# Patient Record
Sex: FEMALE | Race: BLACK OR AFRICAN AMERICAN | NOT HISPANIC OR LATINO
[De-identification: names, ages, dates, MRNs, and addresses within clinical notes are randomized per-mention and may not be internally consistent; named-entity substitution may affect disease eponyms.]

---

## 2019-11-19 PROBLEM — Z00.00 ENCOUNTER FOR PREVENTIVE HEALTH EXAMINATION: Status: ACTIVE | Noted: 2019-11-19

## 2019-12-03 ENCOUNTER — TRANSCRIPTION ENCOUNTER (OUTPATIENT)
Age: 74
End: 2019-12-03

## 2019-12-03 ENCOUNTER — APPOINTMENT (OUTPATIENT)
Dept: OTOLARYNGOLOGY | Facility: CLINIC | Age: 74
End: 2019-12-03
Payer: MEDICARE

## 2019-12-03 VITALS
OXYGEN SATURATION: 99 % | HEIGHT: 65 IN | HEART RATE: 73 BPM | TEMPERATURE: 98.3 F | DIASTOLIC BLOOD PRESSURE: 71 MMHG | SYSTOLIC BLOOD PRESSURE: 119 MMHG | BODY MASS INDEX: 28.66 KG/M2 | WEIGHT: 172 LBS | RESPIRATION RATE: 14 BRPM

## 2019-12-03 DIAGNOSIS — E73.9 LACTOSE INTOLERANCE, UNSPECIFIED: ICD-10-CM

## 2019-12-03 DIAGNOSIS — Z86.59 PERSONAL HISTORY OF OTHER MENTAL AND BEHAVIORAL DISORDERS: ICD-10-CM

## 2019-12-03 DIAGNOSIS — M43.16 SPONDYLOLISTHESIS, LUMBAR REGION: ICD-10-CM

## 2019-12-03 DIAGNOSIS — Z86.69 PERSONAL HISTORY OF OTHER DISEASES OF THE NERVOUS SYSTEM AND SENSE ORGANS: ICD-10-CM

## 2019-12-03 DIAGNOSIS — Z82.49 FAMILY HISTORY OF ISCHEMIC HEART DISEASE AND OTHER DISEASES OF THE CIRCULATORY SYSTEM: ICD-10-CM

## 2019-12-03 DIAGNOSIS — M25.569 PAIN IN UNSPECIFIED KNEE: ICD-10-CM

## 2019-12-03 DIAGNOSIS — Z86.39 PERSONAL HISTORY OF OTHER ENDOCRINE, NUTRITIONAL AND METABOLIC DISEASE: ICD-10-CM

## 2019-12-03 DIAGNOSIS — R26.2 DIFFICULTY IN WALKING, NOT ELSEWHERE CLASSIFIED: ICD-10-CM

## 2019-12-03 DIAGNOSIS — L72.0 EPIDERMAL CYST: ICD-10-CM

## 2019-12-03 DIAGNOSIS — K80.20 CALCULUS OF GALLBLADDER W/OUT CHOLECYSTITIS W/OUT OBSTRUCTION: ICD-10-CM

## 2019-12-03 DIAGNOSIS — Z87.19 PERSONAL HISTORY OF OTHER DISEASES OF THE DIGESTIVE SYSTEM: ICD-10-CM

## 2019-12-03 DIAGNOSIS — G89.29 PAIN IN UNSPECIFIED KNEE: ICD-10-CM

## 2019-12-03 DIAGNOSIS — Z87.09 PERSONAL HISTORY OF OTHER DISEASES OF THE RESPIRATORY SYSTEM: ICD-10-CM

## 2019-12-03 DIAGNOSIS — I10 ESSENTIAL (PRIMARY) HYPERTENSION: ICD-10-CM

## 2019-12-03 DIAGNOSIS — Z87.39 PERSONAL HISTORY OF OTHER DISEASES OF THE MUSCULOSKELETAL SYSTEM AND CONNECTIVE TISSUE: ICD-10-CM

## 2019-12-03 DIAGNOSIS — Z82.3 FAMILY HISTORY OF STROKE: ICD-10-CM

## 2019-12-03 DIAGNOSIS — Z83.3 FAMILY HISTORY OF DIABETES MELLITUS: ICD-10-CM

## 2019-12-03 DIAGNOSIS — Z83.49 FAMILY HISTORY OF OTHER ENDOCRINE, NUTRITIONAL AND METABOLIC DISEASES: ICD-10-CM

## 2019-12-03 DIAGNOSIS — R09.89 OTHER SPECIFIED SYMPTOMS AND SIGNS INVOLVING THE CIRCULATORY AND RESPIRATORY SYSTEMS: ICD-10-CM

## 2019-12-03 DIAGNOSIS — K44.9 DIAPHRAGMATIC HERNIA W/OUT OBSTRUCTION OR GANGRENE: ICD-10-CM

## 2019-12-03 DIAGNOSIS — M53.80 OTHER SPECIFIED DORSOPATHIES, SITE UNSPECIFIED: ICD-10-CM

## 2019-12-03 PROCEDURE — 31575 DIAGNOSTIC LARYNGOSCOPY: CPT

## 2019-12-03 PROCEDURE — G0268 REMOVAL OF IMPACTED WAX MD: CPT

## 2019-12-03 PROCEDURE — 99204 OFFICE O/P NEW MOD 45 MIN: CPT | Mod: 25

## 2019-12-03 PROCEDURE — 10005 FNA BX W/US GDN 1ST LES: CPT

## 2019-12-03 PROCEDURE — 99205 OFFICE O/P NEW HI 60 MIN: CPT | Mod: 25

## 2019-12-03 RX ORDER — DILTIAZEM HYDROCHLORIDE 240 MG/1
240 CAPSULE, EXTENDED RELEASE ORAL
Refills: 0 | Status: ACTIVE | COMMUNITY

## 2019-12-03 RX ORDER — ALBUTEROL SULFATE 90 UG/1
108 (90 BASE) INHALANT RESPIRATORY (INHALATION)
Refills: 0 | Status: ACTIVE | COMMUNITY

## 2019-12-03 RX ORDER — EPINEPHRINE 0.15 MG/.3ML
0.15 INJECTION INTRAMUSCULAR
Refills: 0 | Status: ACTIVE | COMMUNITY

## 2019-12-03 RX ORDER — LOSARTAN POTASSIUM 100 MG/1
100 TABLET, FILM COATED ORAL
Refills: 0 | Status: ACTIVE | COMMUNITY

## 2019-12-03 RX ORDER — SPIRONOLACTONE 25 MG/1
25 TABLET, FILM COATED ORAL
Refills: 0 | Status: ACTIVE | COMMUNITY

## 2019-12-03 RX ORDER — DOXAZOSIN 2 MG/1
2 TABLET ORAL
Refills: 0 | Status: ACTIVE | COMMUNITY

## 2019-12-03 NOTE — HISTORY OF PRESENT ILLNESS
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 Her old records are unavailable. She had been taking vitamin D3 for a while but stopped a few years ago. She had been on HCTZ for many years on and off.  She developed recurrent hypercalcemia ~ 2 years ago. She was recently evaluated by Dr. Lowe and ionized calcium was elevated at 5.8 (ULN 5.6), serum calcium 10.5 (ULN 10.3), iPTH 42 (not suppressed).  She is euthyroid and vit D 25 OH total is normal at 36.5, and GFR normal at 92. Pamela denies recent shortness of breath, voice changes, dysphagia, anterior neck pain, neck pressure or mass. There is no family history of thyroid cancer. She denies any known radiation exposures in her youth.  She denies calcium, vitamin D supplements or past use of Lithium Carbonate.  There is no family history of nephrolithiasis or renal disease.  There is no history of fragility bone fractures.  She denies muscle weakness, generalized bone aches, joint pain, depression, memory loss, brain fog, nausea, vomiting, abdominal pain, constipation, polyuria/ polydipsia, nephrolithiasis, peptic ulcer disease, or pancreatitis.  She has rare GERD.  A thyroid ultrasound was performed on 11/14/19 demonstrating mild thyromegaly. She has numerous nodules bilaterally. With the exception of a 2.6 cm left lobe nodule (site within gland not specified) TIRADS 3, 1 year f/u recommended.  FNA biopsy for the left 2.6 cm nodule was recommended based on size. No enlarged parathyroid glands were mentioned in the report. She has not had a DEXA scan yet. No further imaging of the neck has been done.

## 2019-12-03 NOTE — CONSULT LETTER
[Dear  ___] : Dear  [unfilled], [Consult Letter:] : I had the pleasure of evaluating your patient, [unfilled]. [Please see my note below.] : Please see my note below. [Consult Closing:] : Thank you very much for allowing me to participate in the care of this patient.  If you have any questions, please do not hesitate to contact me. [Sincerely,] : Sincerely, [FreeTextEntry3] : \par Bassam Bryant M.D., FACS, ECNU\par Director Center for Thyroid & Parathyroid Surgery\par [facility] at [facility]\par Certified in Thyroid/Parathyroid/Neck Ultrasound, ECNU/ AIUM\par \par , Department of Otolaryngology\par [facility] [employer] [facility] [employer] at [facility]\par

## 2019-12-03 NOTE — REASON FOR VISIT
[FreeTextEntry2] : a NTMNG and prior history of parathyroidectomy in 2007 with recurrent hypercalcemia 2 years ago. [FreeTextEntry1] : PCP is  Katheryn Alexis MD,  Endocrinologist and referrer is Yakelin Lowe MD

## 2019-12-03 NOTE — PROCEDURE
[Topical Lidocaine] : topical lidocaine [Image(s) Captured] : image(s) captured and filed [Oxymetazoline HCl] : oxymetazoline HCl [Flexible Endoscope] : examined with the flexible endoscope [Serial Number: ___] : Serial Number: [unfilled] [Cerumen Impaction] : Cerumen Impaction [Same] : same as the Pre Op Dx. [] : Removal of Cerumen [FreeTextEntry3] : .................................................[facility]\par ........................................ULTRASOUND-GUIDED THYROID FINE NEEDLE ASPIRATION BIOPSY REPORT\par \par NAME: FLORIN NIELSON.      MR#  [id number]  ..      : 1945     ..         DATE: 12/3/19 ..TIME: 5:47 PM\par \par HISTORY/ INDICATIONS: 74- year-old female with a dominant left 2.4 CM thyroid nodule to rule out malignancy and possible primary hyperparathyroidism.\par \par EXAM: Real-time high-resolution ultrasound imaging of the thyroid gland was performed in the longitudinal and transverse planes with color power Doppler supplementation and image capture.\par \par FINDINGS: A left mid to lower lobe solid nodule measuring 2.41 x 2.21 x 2.54 cm (longitudinal x AP x transverse) was identified and targeted for USG-FNA.  The nodule had the following ultrasonographic characteristics: solid, [id number], heterogeneous, with ill-defined margins, no punctate echogenic foci, grade 3 vascularity on color Doppler, and wider-than-tall.\par \par PROCEDURE: A time out took place and documented for correct patient identifiers, site and side of procedure.  After obtaining informed consent with discussion of risks, benefits and alternatives, the patient was positioned semi-supine, the skin was prepped with alcohol and 0.5 cc of 1% Lidocaine with 1:100,000 epinephrine was injected for local anesthesia. A #25-gauge needle was then passed along the perpendicular plane of the transducer, positioned within the nodule and confirmed with ultrasonography.  Multiple aspirations were made within the nodule with two separate needle punctures, four passes each.  Aspirates were smeared directly placed into both cytolyt and RNA protect solutions for cytologic analysis, immunohistochemistry, and possible molecular genomic diagnostics.  The patient tolerated the procedure well without complications and was discharged with signed post-procedure instructions indicating the importance of following up on all results. \par \par ASSESSMENT & PLAN: Successful USG-FNA of a left mid to lower lobe solid nodule was well tolerated without complications. The patient will be contacted to review the cytologic findings as soon as available for further treatment planning.  A discussion took place with the patient who accepted the responsibility to call the office to review the cytology results if no communication occurs within two weeks. Follow-up imaging in 1 year is recommended if the cytology is reported as benign, [place] Category II.\par \par Electronically signed by MARV GAITAN M.D., FACS on 12/3/19, 6 PM.\par \par [facility]: [address] [place] [address], [address],  [place] [address]\par [phone] (voice), [phone] (fax) [de-identified] : The nasal septum is minimally deviated to the right with a synechia on the left.     There are no masses or polyps and the nasal mucosa and secretions are normal. The choanae and posterior nasopharynx are normal without masses or drainage. The Eustachian tube orifices appear patent. The pharynx, including the posterior and lateral pharyngeal walls, the vallecula and base of tongue are normal without ulcerations, lesions or masses. The hypopharynx including the pyriform sinuses open well without pooling of secretions, mucosal lesions or masses. The supraglottic larynx including the epiglottis, petiole, arytenoids, glossoepiglottic, aryepiglottic and pharyngoepiglottic folds are normal without mucosal lesions, ulcerations or masses. The glottis reveals normal false vocal folds. The true vocal folds are glistening white, tense and of equal length, without paralysis, having symmetric mobility on adduction and abduction. There are no mucosal lesions, nodules, cysts, erythroplasia or leukoplakia. The posterior cricoid area has healthy pink mucosa in the interarytenoid area and esophageal inlet. There is [no] thickening/edema of the interarytenoid mucosa suggestive of posterior laryngitis from laryngopharyngeal acid reflux disease. The trachea is clear without narrowing in the immediate subglottic region, without deviation or lesions. \par  [de-identified] : Multiple thyroid nodules and preoperative assessment for recurrent hyperparathyroidism [FreeTextEntry1] : Bilateral impacted cerumen and hearing loss with tinnitus AU [FreeTextEntry6] : Copious cerumen removed bilaterally with curette and suctioned. The tympanic membranes are normal bilaterally. Hearing evaluation is recommended.

## 2019-12-04 LAB
25(OH)D3 SERPL-MCNC: 38.5 NG/ML
ALBUMIN SERPL ELPH-MCNC: 4.6 G/DL
ALP BLD-CCNC: 111 U/L
ALT SERPL-CCNC: 19 U/L
ANION GAP SERPL CALC-SCNC: 12 MMOL/L
AST SERPL-CCNC: 19 U/L
BILIRUB SERPL-MCNC: 0.2 MG/DL
BUN SERPL-MCNC: 19 MG/DL
CA-I SERPL-SCNC: 1.38 MMOL/L
CALCIUM SERPL-MCNC: 10.7 MG/DL
CALCIUM SERPL-MCNC: 10.7 MG/DL
CHLORIDE SERPL-SCNC: 104 MMOL/L
CO2 SERPL-SCNC: 26 MMOL/L
CREAT SERPL-MCNC: 0.77 MG/DL
GLUCOSE SERPL-MCNC: 91 MG/DL
PARATHYROID HORMONE INTACT: 48 PG/ML
POTASSIUM SERPL-SCNC: 4.5 MMOL/L
PROT SERPL-MCNC: 7.3 G/DL
SODIUM SERPL-SCNC: 142 MMOL/L
TSH SERPL-ACNC: 1.61 UIU/ML

## 2019-12-07 LAB — ACE BLD-CCNC: 31 U/L

## 2019-12-14 LAB
CAU: 8 MG/DL
CREAT 24H UR-MCNC: 1.1 G/24 H
CREAT ?TM UR-MCNC: 47 MG/DL
PROT ?TM UR-MCNC: 24 HR
SPECIMEN VOL 24H UR: 192 MG/24 H
SPECIMEN VOL 24H UR: 2400 ML

## 2020-01-20 ENCOUNTER — FORM ENCOUNTER (OUTPATIENT)
Age: 75
End: 2020-01-20

## 2020-01-21 ENCOUNTER — OUTPATIENT (OUTPATIENT)
Dept: OUTPATIENT SERVICES | Facility: HOSPITAL | Age: 75
LOS: 1 days | End: 2020-01-21
Payer: MEDICARE

## 2020-01-21 PROCEDURE — A9500: CPT

## 2020-01-21 PROCEDURE — 78072 PARATHYRD PLANAR W/SPECT&CT: CPT | Mod: 26

## 2020-01-21 PROCEDURE — 78072 PARATHYRD PLANAR W/SPECT&CT: CPT

## 2020-01-21 PROCEDURE — A9512: CPT

## 2020-02-06 ENCOUNTER — APPOINTMENT (OUTPATIENT)
Dept: OTOLARYNGOLOGY | Facility: CLINIC | Age: 75
End: 2020-02-06
Payer: MEDICARE

## 2020-02-06 VITALS
OXYGEN SATURATION: 98 % | TEMPERATURE: 98.8 F | SYSTOLIC BLOOD PRESSURE: 129 MMHG | DIASTOLIC BLOOD PRESSURE: 72 MMHG | RESPIRATION RATE: 15 BRPM | HEART RATE: 85 BPM

## 2020-02-06 PROCEDURE — 99214 OFFICE O/P EST MOD 30 MIN: CPT

## 2020-02-06 NOTE — HISTORY OF PRESENT ILLNESS
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 Her old records are unavailable. She had been taking vitamin D3 for a while but stopped a few years ago. She had been on HCTZ for many years on and off.  She developed recurrent hypercalcemia ~ 2 years ago. She was recently evaluated by Dr. Lowe and ionized calcium was elevated at 5.8 (ULN 5.6), serum calcium 10.5 (ULN 10.3), iPTH 42 (not suppressed).  She is euthyroid and vit D 25 OH total is normal at 36.5, and GFR normal at 92. Pamela denies recent shortness of breath, voice changes, dysphagia, anterior neck pain, neck pressure or mass. There is no family history of thyroid cancer. She denies any known radiation exposures in her youth.  She denies calcium, vitamin D supplements or past use of Lithium Carbonate.  There is no family history of nephrolithiasis or renal disease.  There is no history of fragility bone fractures.  She denies muscle weakness, generalized bone aches, joint pain, depression, memory loss, brain fog, nausea, vomiting, abdominal pain, constipation, polyuria/ polydipsia, nephrolithiasis, peptic ulcer disease, or pancreatitis.  She has rare GERD.  A thyroid ultrasound was performed on 11/14/19 demonstrating mild thyromegaly. She has numerous nodules bilaterally. With the exception of a 2.6 cm left lobe nodule (site within gland not specified) TIRADS 3, 1 year f/u recommended.  FNA biopsy for the left 2.6 cm nodule was recommended based on size. No enlarged parathyroid glands were mentioned in the report. She has not had a DEXA scan yet. No further imaging of the neck has been done.  \par  [FreeTextEntry1] : Pamela returns to review parathyroid localizing imaging with 4-D CT scan of the neck demonstrating a LEFT inferior positioned parathyroid adenoma. However, this could not be identified on the ultrasound correlate.  This was not noted on recent SPECT Sestamibi scan. Four surgical clips were noted on the right side of the neck. Repeat labs confirm primary hyperparathyroidism with a nonsuppressed PTH, elevated serum and ionized calcium, hypercalciuria and normal vitamin D 25-OH total. She had an FNA biopsy from a left mid lower lobe thyroid nodule that was benign, [place] category II in December.

## 2020-02-06 NOTE — CONSULT LETTER
[Consult Letter:] : I had the pleasure of evaluating your patient, [unfilled]. [Dear  ___] : Dear  [unfilled], [Please see my note below.] : Please see my note below. [Consult Closing:] : Thank you very much for allowing me to participate in the care of this patient.  If you have any questions, please do not hesitate to contact me. [Sincerely,] : Sincerely, [DrBomo  ___] : Dr. EGAN [FreeTextEntry3] : \par Bassam Bryant M.D., FACS, ECNU\par Director Center for Thyroid & Parathyroid Surgery\par [facility]\par Certified in Thyroid/Parathyroid/Neck Ultrasound, ECNU/ AIUM\par \par , Department of Otolaryngology\par [facility] [employer] at [facility]\par

## 2020-02-06 NOTE — DATA REVIEWED
[de-identified] : see PHI [de-identified] : see PHI [de-identified] : see HPI [de-identified] : Sestamibi scan reviewed

## 2020-02-06 NOTE — REASON FOR VISIT
[FreeTextEntry1] : PCP is Katheryn Alexis MD,  Endocrinologist and referrer is Yakelin Lowe MD [FreeTextEntry2] : a follow up visit for a NTMNG and prior history of parathyroidectomy in 2007 with recurrent hypercalcemia 2 years ago.

## 2020-06-16 ENCOUNTER — APPOINTMENT (OUTPATIENT)
Dept: OTOLARYNGOLOGY | Facility: CLINIC | Age: 75
End: 2020-06-16
Payer: MEDICARE

## 2020-06-16 VITALS
DIASTOLIC BLOOD PRESSURE: 73 MMHG | SYSTOLIC BLOOD PRESSURE: 147 MMHG | OXYGEN SATURATION: 99 % | TEMPERATURE: 97.7 F | HEART RATE: 69 BPM

## 2020-06-16 VITALS
SYSTOLIC BLOOD PRESSURE: 126 MMHG | OXYGEN SATURATION: 98 % | TEMPERATURE: 98.3 F | DIASTOLIC BLOOD PRESSURE: 76 MMHG | HEART RATE: 55 BPM

## 2020-06-16 PROCEDURE — 31575 DIAGNOSTIC LARYNGOSCOPY: CPT

## 2020-06-16 PROCEDURE — 99215 OFFICE O/P EST HI 40 MIN: CPT | Mod: 25

## 2020-06-16 NOTE — DATA REVIEWED
[de-identified] : see PHI [de-identified] : see PHI [de-identified] : Sestamibi scan reviewed [de-identified] : see HPI

## 2020-06-16 NOTE — HISTORY OF PRESENT ILLNESS
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 Her old records are unavailable. She had been taking vitamin D3 for a while but stopped a few years ago. She had been on HCTZ for many years on and off.  She developed recurrent hypercalcemia ~ 2 years ago. She was recently evaluated by Dr. Lowe and ionized calcium was elevated at 5.8 (ULN 5.6), serum calcium 10.5 (ULN 10.3), iPTH 42 (not suppressed).  She is euthyroid and vit D 25 OH total is normal at 36.5, and GFR normal at 92. Pamela denies recent shortness of breath, voice changes, dysphagia, anterior neck pain, neck pressure or mass. There is no family history of thyroid cancer. She denies any known radiation exposures in her youth.  She denies calcium, vitamin D supplements or past use of Lithium Carbonate.  There is no family history of nephrolithiasis or renal disease.  There is no history of fragility bone fractures.  She denies muscle weakness, generalized bone aches, joint pain, depression, memory loss, brain fog, nausea, vomiting, abdominal pain, constipation, polyuria/ polydipsia, nephrolithiasis, peptic ulcer disease, or pancreatitis.  She has rare GERD.  A thyroid ultrasound was performed on 11/14/19 demonstrating mild thyromegaly. She has numerous nodules bilaterally. With the exception of a 2.6 cm left lobe nodule (site within gland not specified) TIRADS 3, 1 year f/u recommended.  FNA biopsy for the left 2.6 cm nodule was recommended based on size. No enlarged parathyroid glands were mentioned in the report. She has not had a DEXA scan yet. No further imaging of the neck has been done.  \par  [FreeTextEntry1] : Pamela returns to review parathyroid localizing imaging with 4-D CT scan of the neck initially demonstrating a  left inferior positioned parathyroid adenoma. However, this could not be identified on the ultrasound correlate.  This was not noted on recent SPECT Sestamibi scan. Four surgical clips were noted on the right side of the neck. Repeat labs confirm primary hyperparathyroidism with a nonsuppressed PTH, elevated serum and ionized calcium, hypercalciuria and normal vitamin D 25-OH total. She had an FNA biopsy from a left mid lower lobe thyroid nodule that was benign, [place] category II in December.  Her 4-D CT scan was reviewed by Bassam Posada and he is now convinced that there is a 1.2 cm left superior hyperplastic parathyroid gland adjacent to a surgical clip posterior to the mid-upper left thyroid lobe and likely is the source for her recurrent PHPT.  Her DEXA scan is normal but she has hypercalciuria at 192 mg/24 hrs.  She denies fever, body aches, cough, cyanosis, chest burning, anosmia or recent known COVID exposures.  She had tested negative for COVID with PCR a few weeks ago and has no antibodies. All family members at home are well.

## 2020-06-16 NOTE — PROCEDURE
[Unable to Cooperate with Mirror] : patient unable to cooperate with mirror [Image(s) Captured] : image(s) captured and filed [Gag Reflex] : gag reflex preventing mirror examination [Topical Lidocaine] : topical lidocaine [Oxymetazoline HCl] : oxymetazoline HCl [Flexible Endoscope] : examined with the flexible endoscope [Serial Number: ___] : Serial Number: [unfilled] [de-identified] : Multiple thyroid nodules and preoperative assessment for recurrent hyperparathyroidism [de-identified] : The nasal septum is deviated to the right with a large tortuous septal spur. There are no masses or polyps and the nasal mucosa and secretions are normal. The choanae and posterior nasopharynx are normal without masses or drainage. The Eustachian tube orifices appear patent. The pharynx, including the posterior and lateral pharyngeal walls, the vallecula and base of tongue are normal without ulcerations, lesions or masses. The hypopharynx including the pyriform sinuses open well without pooling of secretions, mucosal lesions or masses. The supraglottic larynx including the epiglottis, petiole, arytenoids, glossoepiglottic, aryepiglottic and pharyngoepiglottic folds are normal without mucosal lesions, ulcerations or masses. The glottis reveals normal false vocal folds. The true vocal folds are glistening white, tense and of equal length, without paralysis, having symmetric mobility on adduction and abduction. There are no mucosal lesions, nodules, cysts, erythroplasia or leukoplakia. The posterior cricoid area has healthy pink mucosa in the interarytenoid area and esophageal inlet. There is [no] thickening/edema of the interarytenoid mucosa suggestive of posterior laryngitis from laryngopharyngeal acid reflux disease. The trachea is clear without narrowing in the immediate subglottic region, without deviation or lesions. \par

## 2020-06-16 NOTE — REASON FOR VISIT
[FreeTextEntry2] : a follow up visit for a NTMNG and prior history of parathyroidectomy in 2007 with recurrent hypercalcemia 2 years ago. [FreeTextEntry1] : PCP is Katheryn Alexis MD,  Endocrinologist and referrer is Yakelin Lowe MD

## 2020-06-16 NOTE — CONSULT LETTER
[Dear  ___] : Dear  [unfilled], [Please see my note below.] : Please see my note below. [Consult Closing:] : Thank you very much for allowing me to participate in the care of this patient.  If you have any questions, please do not hesitate to contact me. [Consult Letter:] : I had the pleasure of evaluating your patient, [unfilled]. [Sincerely,] : Sincerely, [DrBoom  ___] : Dr. EGAN [FreeTextEntry3] : \par Bassam Bryant M.D., FACS, ECNU\par Director Center for Thyroid & Parathyroid Surgery\par [facility]\par Certified in Thyroid/Parathyroid/Neck Ultrasound, ECNU/ AIUM\par \par , Department of Otolaryngology\par [facility] [employer] [facility] at [facility]\par

## 2020-06-22 LAB
25(OH)D3 SERPL-MCNC: 46.5 NG/ML
ALBUMIN SERPL ELPH-MCNC: 4.9 G/DL
ALP BLD-CCNC: 123 U/L
ALT SERPL-CCNC: 25 U/L
ANION GAP SERPL CALC-SCNC: 12 MMOL/L
AST SERPL-CCNC: 20 U/L
BILIRUB SERPL-MCNC: 0.3 MG/DL
BUN SERPL-MCNC: 15 MG/DL
CALCIUM SERPL-MCNC: 11 MG/DL
CALCIUM SERPL-MCNC: 11 MG/DL
CHLORIDE SERPL-SCNC: 101 MMOL/L
CO2 SERPL-SCNC: 27 MMOL/L
CREAT SERPL-MCNC: 0.8 MG/DL
GLUCOSE SERPL-MCNC: 106 MG/DL
PARATHYROID HORMONE INTACT: 35 PG/ML
POTASSIUM SERPL-SCNC: 4.6 MMOL/L
PROT SERPL-MCNC: 7.1 G/DL
SODIUM SERPL-SCNC: 140 MMOL/L
T4 FREE SERPL-MCNC: 1.2 NG/DL
THYROGLOB AB SERPL-ACNC: <20 IU/ML
THYROPEROXIDASE AB SERPL IA-ACNC: <10 IU/ML
TSH SERPL-ACNC: 1.5 UIU/ML

## 2020-06-25 LAB — PTH RELATED PROT SERPL-MCNC: <2 PMOL/L

## 2020-07-13 ENCOUNTER — APPOINTMENT (OUTPATIENT)
Dept: ENDOCRINOLOGY | Facility: CLINIC | Age: 75
End: 2020-07-13
Payer: MEDICARE

## 2020-07-13 VITALS
BODY MASS INDEX: 29.32 KG/M2 | DIASTOLIC BLOOD PRESSURE: 69 MMHG | HEIGHT: 65 IN | HEART RATE: 69 BPM | SYSTOLIC BLOOD PRESSURE: 123 MMHG | WEIGHT: 176 LBS

## 2020-07-13 LAB
GLUCOSE BLDC GLUCOMTR-MCNC: 87
HBA1C MFR BLD HPLC: 6.2

## 2020-07-13 PROCEDURE — 99205 OFFICE O/P NEW HI 60 MIN: CPT | Mod: 25

## 2020-07-13 PROCEDURE — 82962 GLUCOSE BLOOD TEST: CPT

## 2020-07-13 PROCEDURE — 83036 HEMOGLOBIN GLYCOSYLATED A1C: CPT | Mod: QW

## 2020-07-21 NOTE — HISTORY OF PRESENT ILLNESS
[FreeTextEntry1] : Ms. Stern is a 75 year-old woman with a history of type 2 diabetes mellitus, recurrent primary hyperparathyroidism, thyroid nodules, hypertension presenting to establish care with me for her endocrine issues. She is referred by Dr. Bassam Bryant, who is managing her thyroid nodules. She is a retired nurse.\par \par Primary hyperparathyroidism.\par She was diagnosed with primary hyperparathyroidism in the 1990s and is status post parathyroid surgery in 2007.\par She developed recurrent primary hyperparathyroidism around 2017 with plan for a second parathyroid procedure. Imaging demonstrated a 1.2 cm left superior hyperplastic parathyroid gland adjacent to a surgical clip posterior to the mid-upper left thyroid lobe.\par No history of kidney stones. No nephrolithiasis on renal ultrasound in November 2019. 24 hour urine calcium excretion 192 mg in December 2019 (normal range typically  mg for women).\par No history of fracture. Bone density in December 2019 within range at the spine, hip, and wrist. \par She has yogurt most days. She has a [employer] multivitamin (calcium 450 mg, vitamin D 400 intl units) daily.  \par No history of radiation therapy. She was on and off hydrochlorothiazide in the past. No history of lithium use.\par Father with a history of primary hyperparathyroidism. No other endocrine tumors. \par \par Type 2 diabetes mellitus. Point-of-care HbA1c 6.2% and blood glucose 87 mg/dL today. No known history of micro- or macrovascular complications.\par She was diagnosed with diabetes in 2015. No hospitalizations for hypo- or hyperglycemia.\par She is currently taking metformin 500 mg daily\par She is on a blood pressure regimen\par She is not on a statin for cholesterol\par Nephrology screening: Treated with an angiotenin receptor blocker\par Last ophthalmology appointment: Over six months; appointment was cancelled due to the pandemic\par Last podiatry appointment: June 2020\par Last dental appointment: Over six months; upcoming appointment\par \par She has occasional palpitations. No chest pain, shortness of breath, polyuria/polydipsia.

## 2020-07-21 NOTE — ASSESSMENT
[FreeTextEntry1] : Primary hyperparathyroidism. She was diagnosed with primary hyperparathyroidism in the 1990s and is status post parathyroid surgery in 2007. She developed recurrent primary hyperparathyroidism around 2017. She has hypercalcemia with inappropriately normal PTH concentrations consistent with primary hyperparathyroidism. Evaluation for other causes of hypercalcemia unremarkable. We discussed the complications of primary hyperparathyroidism, including but not limited to hypercalcemia, nephrolithiasis, and osteoporosis. We discussed the recommendations for calcium and vitamin D intake; there is no indication to restrict calcium intake in patients with primary hyperparathyroidism. She may be interested in genetic testing due to her family history of primary hyperparathyroidism in her father and personal history of recurrent disease. She is reconsidering whether she wants to proceed with parathyroid surgery at this time versus a later date; I advised she discuss with Dr. Bryant immediately. \par Calcium 9974-7450 mg daily from diet and supplements (to be taken in divided doses as no more than 500-600 mg can be absorbed at one time); advised dietary calcium\par Continue current vitamin D regimen\par \par Type 2 diabetes mellitus. Point-of-care HbA1c 6.2% and blood glucose 87 mg/dL today. No known history of micro- or macrovascular complications. I congratulated her on her excellent glycemic control. We discussed the importance of diet and exercise and lifestyle modification for glycemic control.\par Continue metformin 500 mg daily\par She is on a blood pressure regimen; blood pressure around goal\par She is not on a statin for cholesterol; she declines therapy at this time\par Nephrology screening: Treated with an angiotenin receptor blocker\par Last ophthalmology appointment: Over six months; appointment was cancelled due to the pandemic and advised appointment when appropriate\par Last podiatry appointment: June 2020\par Last dental appointment: Over six months; upcoming appointment\par \par Return to see me in 6 months. Patient advised to call earlier with significant hypo- or hyperglycemia. \par \par CC:\par Dr. Katheryn Alexis, Fax [phone]

## 2020-07-21 NOTE — PHYSICAL EXAM
[Alert] : alert [Healthy Appearance] : healthy appearance [No Acute Distress] : no acute distress [Normal Sclera/Conjunctiva] : normal sclera/conjunctiva [No Respiratory Distress] : no respiratory distress [Clear to Auscultation] : lungs were clear to auscultation bilaterally [Normal Rate] : heart rate was normal [Normal S1, S2] : normal S1 and S2 [Regular Rhythm] : with a regular rhythm [No Stigmata of Cushings Syndrome] : no stigmata of Cushings Syndrome [Normal Gait] : normal gait [Normal Insight/Judgement] : insight and judgment were intact [Kyphosis] : no kyphosis present [de-identified] : no moon facies, no supraclavicular fat pads

## 2020-07-22 ENCOUNTER — APPOINTMENT (OUTPATIENT)
Dept: OTOLARYNGOLOGY | Facility: HOSPITAL | Age: 75
End: 2020-07-22

## 2020-12-31 NOTE — DATA REVIEWED
[FreeTextEntry1] : Most recent bone mineral density\par Date: December 10, 2019\par Source: Lunar\par Site: [facility]\par \par Site	BMD (g/cm2)	T-score	Change previous	Change baseline	\par Lumbar spine	1.080	-0.7\par Femoral neck	0.944	-0.7 (left)	\par Total hip	                0.946       -0.5 (left)         \par Distal radius           	0.684       -0.3         \par DXA Comments: \par  
Name band;

## 2021-03-03 ENCOUNTER — APPOINTMENT (OUTPATIENT)
Dept: ENDOCRINOLOGY | Facility: CLINIC | Age: 76
End: 2021-03-03
Payer: MEDICARE

## 2021-03-03 ENCOUNTER — APPOINTMENT (OUTPATIENT)
Dept: OTOLARYNGOLOGY | Facility: CLINIC | Age: 76
End: 2021-03-03
Payer: MEDICARE

## 2021-03-03 VITALS
BODY MASS INDEX: 30.45 KG/M2 | HEART RATE: 86 BPM | WEIGHT: 183 LBS | DIASTOLIC BLOOD PRESSURE: 82 MMHG | SYSTOLIC BLOOD PRESSURE: 169 MMHG

## 2021-03-03 VITALS — TEMPERATURE: 97.8 F

## 2021-03-03 DIAGNOSIS — H61.23 IMPACTED CERUMEN, BILATERAL: ICD-10-CM

## 2021-03-03 DIAGNOSIS — I10 ESSENTIAL (PRIMARY) HYPERTENSION: ICD-10-CM

## 2021-03-03 LAB
GLUCOSE BLDC GLUCOMTR-MCNC: 89
HBA1C MFR BLD HPLC: 6.1

## 2021-03-03 PROCEDURE — 82962 GLUCOSE BLOOD TEST: CPT

## 2021-03-03 PROCEDURE — 83036 HEMOGLOBIN GLYCOSYLATED A1C: CPT | Mod: QW

## 2021-03-03 PROCEDURE — 99072 ADDL SUPL MATRL&STAF TM PHE: CPT

## 2021-03-03 PROCEDURE — 99214 OFFICE O/P EST MOD 30 MIN: CPT | Mod: 25

## 2021-03-03 PROCEDURE — 31575 DIAGNOSTIC LARYNGOSCOPY: CPT

## 2021-03-03 PROCEDURE — 69210 REMOVE IMPACTED EAR WAX UNI: CPT

## 2021-03-03 RX ORDER — MULTIVITAMIN
TABLET ORAL
Refills: 0 | Status: ACTIVE | COMMUNITY

## 2021-03-03 NOTE — PROCEDURE
[Image(s) Captured] : image(s) captured and filed [Unable to Cooperate with Mirror] : patient unable to cooperate with mirror [Gag Reflex] : gag reflex preventing mirror examination [Topical Lidocaine] : topical lidocaine [Oxymetazoline HCl] : oxymetazoline HCl [Flexible Endoscope] : examined with the flexible endoscope [Serial Number: ___] : Serial Number: [unfilled] [Cerumen Impaction] : Cerumen Impaction [Same] : same as the Pre Op Dx. [] : Removal of Cerumen [de-identified] : The nasal septum is deviated to the right with a large serpentine septal spur. There are no masses or polyps and the nasal mucosa and secretions are normal. The choanae and posterior nasopharynx are normal without masses or drainage. The Eustachian tube orifices appear patent. The pharynx, including the posterior and lateral pharyngeal walls, the vallecula and base of tongue are normal without ulcerations, lesions or masses. The hypopharynx including the pyriform sinuses open well without pooling of secretions, mucosal lesions or masses. The supraglottic larynx including the epiglottis, petiole, arytenoids, glossoepiglottic, aryepiglottic and pharyngoepiglottic folds are normal without mucosal lesions, ulcerations or masses. The glottis reveals normal false vocal folds. The true vocal folds are glistening white, tense and of equal length, without paralysis, having symmetric mobility on adduction and abduction. There are no mucosal lesions, nodules, cysts, erythroplasia or leukoplakia. The posterior cricoid area has healthy pink mucosa in the interarytenoid area and esophageal inlet. There is mild thickening/edema of the interarytenoid mucosa suggestive of posterior laryngitis from laryngopharyngeal acid reflux disease. The trachea is clear without narrowing in the immediate subglottic region, without deviation or lesions. \par  [de-identified] : Multiple thyroid nodules and recurrent hyperparathyroidism [FreeTextEntry1] : Bilateral cerumen impaction [FreeTextEntry6] : Copious cerumen removed from both external ear canals with curettes. The tympanic membranes were normal.

## 2021-03-03 NOTE — CONSULT LETTER
[Dear  ___] : Dear  [unfilled], [Consult Letter:] : I had the pleasure of evaluating your patient, [unfilled]. [Please see my note below.] : Please see my note below. [Consult Closing:] : Thank you very much for allowing me to participate in the care of this patient.  If you have any questions, please do not hesitate to contact me. [Sincerely,] : Sincerely, [DrBoom  ___] : Dr. EGAN [FreeTextEntry3] : \par Bassam Bryant M.D., FACS, ECNU\par Director [facility] Parathyroid [facility]\par [facility]\par Certified in Thyroid/Parathyroid/Neck Ultrasound, ECNU/ AIUM\par \par , Department of Otolaryngology\par [facility] [employer] [facility] at [facility]\par

## 2021-03-03 NOTE — REASON FOR VISIT
[No Acute Distress] : no acute distress [Well Nourished] : well nourished [Well Developed] : well developed [Well-Appearing] : well-appearing [Normal Sclera/Conjunctiva] : normal sclera/conjunctiva [Normal Outer Ear/Nose] : the outer ears and nose were normal in appearance [EOMI] : extraocular movements intact [Normal Oropharynx] : the oropharynx was normal [Normal TMs] : both tympanic membranes were normal [No Lymphadenopathy] : no lymphadenopathy [Supple] : supple [No JVD] : no jugular venous distention [Thyroid Normal, No Nodules] : the thyroid was normal and there were no nodules present [No Respiratory Distress] : no respiratory distress  [No Accessory Muscle Use] : no accessory muscle use [Normal Rate] : normal rate  [Clear to Auscultation] : lungs were clear to auscultation bilaterally [Regular Rhythm] : with a regular rhythm [No Murmur] : no murmur heard [Normal S1, S2] : normal S1 and S2 [No Carotid Bruits] : no carotid bruits [Pedal Pulses Present] : the pedal pulses are present [No Edema] : there was no peripheral edema [Soft] : abdomen soft [Non Tender] : non-tender [Non-distended] : non-distended [No Masses] : no abdominal mass palpated [Normal Bowel Sounds] : normal bowel sounds [FreeTextEntry2] : a follow up visit for a NTMNG and prior history of parathyroidectomy in 2007 with recurrent hypercalcemia 2 years ago. [No CVA Tenderness] : no CVA  tenderness [FreeTextEntry1] : PCP is Katheryn Alexis MD,  Endocrinologist and referrer is Yakelin Lowe MD [Normal Anterior Cervical Nodes] : no anterior cervical lymphadenopathy [Normal Posterior Cervical Nodes] : no posterior cervical lymphadenopathy [No Joint Swelling] : no joint swelling [Grossly Normal Strength/Tone] : grossly normal strength/tone [No Rash] : no rash [No Focal Deficits] : no focal deficits [Coordination Grossly Intact] : coordination grossly intact [Normal Gait] : normal gait [Normal Insight/Judgement] : insight and judgment were intact [Normal Affect] : the affect was normal [de-identified] : + l/s spinal tenderness  [de-identified] : + b/l thigh tenderness

## 2021-03-03 NOTE — DATA REVIEWED
[de-identified] : see PHI [de-identified] : see PHI [de-identified] : see HPI [de-identified] : Sestamibi scan reviewed

## 2021-03-03 NOTE — HISTORY OF PRESENT ILLNESS
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 Her old records are unavailable. She had been taking vitamin D3 for a while but stopped a few years ago. She had been on HCTZ for many years on and off.  She developed recurrent hypercalcemia ~ 2 years ago. She was recently evaluated by Dr. Lowe and ionized calcium was elevated at 5.8 (ULN 5.6), serum calcium 10.5 (ULN 10.3), iPTH 42 (not suppressed).  She is euthyroid and vit D 25 OH total is normal at 36.5, and GFR normal at 92. Pamela denies recent shortness of breath, voice changes, dysphagia, anterior neck pain, neck pressure or mass. There is no family history of thyroid cancer. She denies any known radiation exposures in her youth.  She denies calcium, vitamin D supplements or past use of Lithium Carbonate.  There is no family history of nephrolithiasis or renal disease.  There is no history of fragility bone fractures.  She denies muscle weakness, generalized bone aches, joint pain, depression, memory loss, brain fog, nausea, vomiting, abdominal pain, constipation, polyuria/ polydipsia, nephrolithiasis, peptic ulcer disease, or pancreatitis.  She has rare GERD.  A thyroid ultrasound was performed on 11/14/19 demonstrating mild thyromegaly. She has numerous nodules bilaterally. With the exception of a 2.6 cm left lobe nodule (site within gland not specified) TIRADS 3, 1 year f/u recommended.  FNA biopsy for the left 2.6 cm nodule was recommended based on size. No enlarged parathyroid glands were mentioned in the report. She has not had a DEXA scan yet. No further imaging of the neck has been done.  \par  [FreeTextEntry1] : Pamela returns to review parathyroid localizing imaging with 4-D CT scan of the neck initially demonstrating a  left inferior positioned parathyroid adenoma. However, this could not be identified on the ultrasound correlate.  This was not noted on recent SPECT Sestamibi scan. Four surgical clips were noted on the right side of the neck. Repeat labs confirm primary hyperparathyroidism with a nonsuppressed PTH, elevated serum and ionized calcium, hypercalciuria and normal vitamin D 25-OH total. She had an FNA biopsy from a left mid lower lobe thyroid nodule that was benign, [place] category II in December.  Her 4-D CT scan was reviewed by Bassam Posada and he is convinced that there is a 1.2 cm left superior hyperplastic parathyroid gland adjacent to a surgical clip posterior to the mid-upper left thyroid lobe and likely is the source for her recurrent PHPT.  Her DEXA scan is normal but she has hypercalciuria at 192 mg/24 hrs.  She denies fever, body aches, cough, cyanosis, chest burning, anosmia or recent known COVID exposures.  She had tested negative for COVID with PCR but has no antibodies. All family members at home are well. She has now been vaccinated. She is being monitored by Dr. Hansen now.  A repeat vitamin D 25-OH is pending. She had a followup thyroid ultrasound in January of this year and all imaged nodules were stable. It was suggested that a left mid lobe nodule undergo FNA biopsy but this had been biopsied already and benign.  She is still reluctant to have parathyroidectomy due to health issues for her SO.  She is drinking more fluids lately.  She had no stones on renal US in 2019. Her last DEAX scan was normal in Dec 2019.

## 2021-03-04 LAB
25(OH)D3 SERPL-MCNC: 40.1 NG/ML
CHOLEST SERPL-MCNC: 178 MG/DL
CREAT SPEC-SCNC: 36 MG/DL
HDLC SERPL-MCNC: 56 MG/DL
LDLC SERPL CALC-MCNC: 98 MG/DL
MICROALBUMIN 24H UR DL<=1MG/L-MCNC: <1.2 MG/DL
MICROALBUMIN/CREAT 24H UR-RTO: NORMAL MG/G
NONHDLC SERPL-MCNC: 122 MG/DL
T4 FREE SERPL-MCNC: 1.2 NG/DL
TRIGL SERPL-MCNC: 119 MG/DL
TSH SERPL-ACNC: 0.88 UIU/ML
VIT B12 SERPL-MCNC: 1143 PG/ML

## 2021-03-12 ENCOUNTER — NON-APPOINTMENT (OUTPATIENT)
Age: 76
End: 2021-03-12

## 2021-03-15 LAB
METANEPHRINE, PL: 10.9 PG/ML
NORMETANEPHRINE, PL: 95.3 PG/ML

## 2021-03-17 RX ORDER — ATORVASTATIN CALCIUM 10 MG/1
10 TABLET, FILM COATED ORAL
Refills: 0 | Status: DISCONTINUED | COMMUNITY
End: 2021-03-17

## 2021-03-17 NOTE — ADDENDUM
[FreeTextEntry1] : Recent laboratory results as below; discussed with Ms. Stern. LDL 98 mg/dL; statin therapy still recommended if LDL >70 mg/dL. She has seen her primary care provider and will be started on atorvastatin 10 mg daily. Urine microalbumin within range. TSH/free T4, vitamin D, vitamin B12 within range. Plasma metanephrines within range. 3/15/21\par \par There is a possible drug interaction between atorvastatin and spironolactone. Ms. Stern's primary care provider is not available at this time to change the prescription. I changed the statin prescription from atorvastatin to rosuvastatin 5 mg daily. 3/17/21

## 2021-03-17 NOTE — DATA REVIEWED
[FreeTextEntry1] : Laboratories (February 4, 2021) reviewed and significant for: \par Calcium 10.9 mg/dL (normal: 8.7-10.3)\par Ionized calcium 6.2 mg/dL (normal: 4.5-5.6)\par PTH 42 pg/mL\par BUN/creatinine 17/0.73 mg/dL (eGFR 94 mL/min)\par HbA1c 6.5%\par \par Most recent bone mineral density\par Date: December 10, 2019\par Source: First Opinion\par Site: [facility]\par \par Site	BMD (g/cm2)	T-score	Change previous	Change baseline	\par Lumbar spine	1.080	-0.7\par Femoral neck	0.944	-0.7 (left)	\par Total hip	                0.946       -0.5 (left)         \par Distal radius           	0.684       -0.3         \par DXA Comments:

## 2021-03-17 NOTE — PHYSICAL EXAM
[Alert] : alert [Healthy Appearance] : healthy appearance [No Acute Distress] : no acute distress [Normal Sclera/Conjunctiva] : normal sclera/conjunctiva [No Respiratory Distress] : no respiratory distress [Clear to Auscultation] : lungs were clear to auscultation bilaterally [Normal S1, S2] : normal S1 and S2 [Normal Rate] : heart rate was normal [Regular Rhythm] : with a regular rhythm [No Stigmata of Cushings Syndrome] : no stigmata of Cushings Syndrome [Normal Gait] : normal gait [Normal Insight/Judgement] : insight and judgment were intact [Kyphosis] : no kyphosis present [de-identified] : no moon facies, no supraclavicular fat pads [de-identified] : Podiatry examination performed in February 2021

## 2021-03-17 NOTE — HISTORY OF PRESENT ILLNESS
[FreeTextEntry1] : Ms. Stern is a 75 year-old woman with a history of type 2 diabetes mellitus, recurrent primary hyperparathyroidism, thyroid nodules, hypertension presenting for follow-up of her endocrine issues. I saw her for an initial visit in July 2020. She was referred by Dr. Bassam Bryant, who is managing her thyroid nodules. She is a retired nurse.\par \par Primary hyperparathyroidism.\par She was diagnosed with primary hyperparathyroidism in the 1990s and is status post parathyroid surgery in 2007.\par She developed recurrent primary hyperparathyroidism around 2017 with plan for a second parathyroid procedure. Imaging demonstrated a 1.2 cm left superior hyperplastic parathyroid gland adjacent to a surgical clip posterior to the mid-upper left thyroid lobe.\par No history of kidney stones. No nephrolithiasis on renal ultrasound in November 2019. 24 hour urine calcium excretion 192 mg in December 2019 (normal range typically  mg for women).\par No history of fracture. Bone density in December 2019 within range at the spine, hip, and wrist. \par She has yogurt daily. She has a [employer] multivitamin (calcium 450 mg, vitamin D 400 intl units) daily.  \par No history of radiation therapy. She was on and off hydrochlorothiazide in the past. No history of lithium use.\par Father with a history of primary hyperparathyroidism. No other endocrine tumors. \par \par Type 2 diabetes mellitus. Point-of-care HbA1c 6.1% and blood glucose 89 mg/dL today; HbA1c 6.2% in July 2020. No known history of micro- or macrovascular complications.\par She was diagnosed with diabetes in 2015. No hospitalizations for hypo- or hyperglycemia.\par At her initial visit she was taking metformin 500 mg daily, increased to 500 mg twice daily in February 2021\par She is on a blood pressure regimen\par She is not on a statin for cholesterol\par Nephrology screening: Treated with an angiotenin receptor blocker\par Last ophthalmology appointment: February 2021\par Last podiatry appointment: February 2021\par Last dental appointment: Regular appointments\par \par Interim History \par Her blood pressure has been up and down. Her cardiologist increased one of her blood pressure medications last visit; she will be scheduling a follow-up appointment. \par Weight is up 7 pounds since last visit. She has been less physically active. No chest pain, shortness of breath, polyuria/polydipsia.\par Medical and surgical history, medications, allergies, social and family history reviewed and updated as needed.

## 2021-03-17 NOTE — ASSESSMENT
[FreeTextEntry1] : Primary hyperparathyroidism. She was diagnosed with primary hyperparathyroidism in the 1990s and is status post parathyroid surgery in 2007. She developed recurrent primary hyperparathyroidism around 2017. She has hypercalcemia with inappropriately normal PTH concentrations consistent with primary hyperparathyroidism. Evaluation for other causes of hypercalcemia unremarkable. We discussed the complications of primary hyperparathyroidism, including but not limited to hypercalcemia, nephrolithiasis, and osteoporosis. We discussed the recommendations for calcium and vitamin D intake; there is no indication to restrict calcium intake in patients with primary hyperparathyroidism. She may be interested in genetic testing due to her family history of primary hyperparathyroidism in her father and personal history of recurrent disease. She is reconsidering whether she wants to proceed with parathyroid surgery at this time versus a later date; she will schedule a follow-up appointment with Dr. Bryant. \par Calcium 2928-5245 mg daily from diet and supplements (to be taken in divided doses as no more than 500-600 mg can be absorbed at one time); advised dietary calcium\par Continue current vitamin D regimen pending level\par Discuss interval bone density testing next visit\par \par Type 2 diabetes mellitus. Point-of-care HbA1c 6.1% and blood glucose 89 mg/dL today. No known history of micro- or macrovascular complications. I congratulated her on her excellent glycemic control. We discussed the importance of diet and exercise and lifestyle modification for glycemic control.\par Continue metformin 500 mg daily\par She is on a blood pressure regimen; blood pressure elevated today and advised follow-up with cardiology; we will check thyroid function and metanephrines today but defer measurement of renin/aldosterone since she is on spironolactone\par She is not on a statin for cholesterol; she may be amenable to therapy pending lipid panel\par Nephrology screening: Treated with an angiotenin receptor blocker\par Last ophthalmology appointment: February 2021\par Last podiatry appointment: February 2021\par Last dental appointment: Regular appointments\par \par Return to see me in 6 months or earlier as needed. Patient advised to call earlier with significant hypo- or hyperglycemia. \par \par CC:\par Dr. Katheryn Alexis, Fax [phone]\par Dr. Alfredo Bowen, Fax [phone]

## 2021-06-10 ENCOUNTER — APPOINTMENT (OUTPATIENT)
Dept: OTOLARYNGOLOGY | Facility: CLINIC | Age: 76
End: 2021-06-10
Payer: MEDICARE

## 2021-06-10 VITALS
DIASTOLIC BLOOD PRESSURE: 69 MMHG | WEIGHT: 180 LBS | SYSTOLIC BLOOD PRESSURE: 144 MMHG | HEART RATE: 69 BPM | HEIGHT: 65 IN | BODY MASS INDEX: 29.99 KG/M2 | OXYGEN SATURATION: 96 % | TEMPERATURE: 98.3 F

## 2021-06-10 DIAGNOSIS — E83.52 HYPERCALCEMIA: ICD-10-CM

## 2021-06-10 DIAGNOSIS — E04.2 NONTOXIC MULTINODULAR GOITER: ICD-10-CM

## 2021-06-10 DIAGNOSIS — K21.9 ACUTE LARYNGITIS: ICD-10-CM

## 2021-06-10 DIAGNOSIS — E89.2 POSTPROCEDURAL HYPOPARATHYROIDISM: ICD-10-CM

## 2021-06-10 DIAGNOSIS — H61.21 IMPACTED CERUMEN, RIGHT EAR: ICD-10-CM

## 2021-06-10 DIAGNOSIS — J04.0 ACUTE LARYNGITIS: ICD-10-CM

## 2021-06-10 DIAGNOSIS — D44.0 NEOPLASM OF UNCERTAIN BEHAVIOR OF THYROID GLAND: ICD-10-CM

## 2021-06-10 PROCEDURE — G0268 REMOVAL OF IMPACTED WAX MD: CPT

## 2021-06-10 PROCEDURE — 31575 DIAGNOSTIC LARYNGOSCOPY: CPT

## 2021-06-10 PROCEDURE — 99214 OFFICE O/P EST MOD 30 MIN: CPT | Mod: 25

## 2021-06-10 PROCEDURE — 99072 ADDL SUPL MATRL&STAF TM PHE: CPT

## 2021-06-10 NOTE — CONSULT LETTER
[Dear  ___] : Dear  [unfilled], [Consult Letter:] : I had the pleasure of evaluating your patient, [unfilled]. [Please see my note below.] : Please see my note below. [Consult Closing:] : Thank you very much for allowing me to participate in the care of this patient.  If you have any questions, please do not hesitate to contact me. [Sincerely,] : Sincerely, [DrBoom  ___] : Dr. EGAN [FreeTextEntry3] : \par Bassam Bryant M.D., FACS, ECNU\par Director Center for Thyroid & Parathyroid Surgery\par [facility]\par Certified in Thyroid/Parathyroid/Neck Ultrasound, ECNU/ AIUM\par \par , Department of Otolaryngology\par [facility] [employer] [facility] at [facility]\par

## 2021-06-10 NOTE — DATA REVIEWED
[de-identified] : see PHI [de-identified] : see PHI [de-identified] : see HPI [de-identified] : Sestamibi scan reviewed

## 2021-06-10 NOTE — HISTORY OF PRESENT ILLNESS
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 Her old records are unavailable. She had been taking vitamin D3 for a while but stopped a few years ago. She had been on HCTZ for many years on and off.  She developed recurrent hypercalcemia ~ 2 years ago. She was recently evaluated by Dr. Lowe and ionized calcium was elevated at 5.8 (ULN 5.6), serum calcium 10.5 (ULN 10.3), iPTH 42 (not suppressed).  She is euthyroid and vit D 25 OH total is normal at 36.5, and GFR normal at 92. Pamela denies recent shortness of breath, voice changes, dysphagia, anterior neck pain, neck pressure or mass. There is no family history of thyroid cancer. She denies any known radiation exposures in her youth.  She denies calcium, vitamin D supplements or past use of Lithium Carbonate.  There is no family history of nephrolithiasis or renal disease.  There is no history of fragility bone fractures.  She denies muscle weakness, generalized bone aches, joint pain, depression, memory loss, brain fog, nausea, vomiting, abdominal pain, constipation, polyuria/ polydipsia, nephrolithiasis, peptic ulcer disease, or pancreatitis.  She has rare GERD.  A thyroid ultrasound was performed on 11/14/19 demonstrating mild thyromegaly. She has numerous nodules bilaterally. With the exception of a 2.6 cm left lobe nodule (site within gland not specified) TIRADS 3, 1 year f/u recommended.  FNA biopsy for the left 2.6 cm nodule was recommended based on size. No enlarged parathyroid glands were mentioned in the report. She has not had a DEXA scan yet. No further imaging of the neck has been done.  \par  [FreeTextEntry1] : Pamela returns to review parathyroid localizing imaging with 4-D CT scan of the neck initially demonstrating a  left inferior positioned parathyroid adenoma. However, this could not be identified on the ultrasound correlate.  This was not noted on recent SPECT Sestamibi scan. Four surgical clips were noted on the right side of the neck. Repeat labs confirm primary hyperparathyroidism with a nonsuppressed PTH, elevated serum and ionized calcium, hypercalciuria and normal vitamin D 25-OH total. She had an FNA biopsy from a left mid lower lobe thyroid nodule that was benign, [place] category II in December.  Her 4-D CT scan was reviewed by Bassam Posada and he is convinced that there is a 1.2 cm left superior hyperplastic parathyroid gland adjacent to a surgical clip posterior to the mid-upper left thyroid lobe and likely is the source for her recurrent PHPT.  Her DEXA scan is normal but she has hypercalciuria at 192 mg/24 hrs.  She denies fever, body aches, cough, cyanosis, chest burning, anosmia or recent known COVID exposures.  She had tested negative for COVID with PCR but has no antibodies. All family members at home are well. She has now been vaccinated. She is being monitored by Dr. Hansen now.  A repeat vitamin D 25-OH is pending. She had a followup thyroid ultrasound in January of this year and all imaged nodules were stable. It was suggested that a left mid lobe nodule undergo FNA biopsy but this had been biopsied already and benign.  She is still reluctant to have parathyroidectomy due to health issues for her SO.  She is drinking more fluids lately.  She had no stones on renal US in 2019. Her last DEXA scan was normal in Dec 2019.  She has not had repeat labs but is now considering a revision parathyroidectomy if her repeat labs are consistent with PHPT. She was having GI problems and an abdominal US done last month did not show any pathology or renal calculi.

## 2021-06-10 NOTE — PROCEDURE
[Image(s) Captured] : image(s) captured and filed [Unable to Cooperate with Mirror] : patient unable to cooperate with mirror [Gag Reflex] : gag reflex preventing mirror examination [Topical Lidocaine] : topical lidocaine [Oxymetazoline HCl] : oxymetazoline HCl [Flexible Endoscope] : examined with the flexible endoscope [Serial Number: ___] : Serial Number: [unfilled] [Cerumen Impaction] : Cerumen Impaction [] : Removal of Cerumen [de-identified] : The nasal septum is deviated to the right with a large serpentine septal spur. There are no masses or polyps and the nasal mucosa and secretions are normal. The choanae and posterior nasopharynx are normal without masses or drainage. The Eustachian tube orifices appear patent. The pharynx, including the posterior and lateral pharyngeal walls, the vallecula and base of tongue are normal without ulcerations, lesions or masses. The hypopharynx including the pyriform sinuses open well without pooling of secretions, mucosal lesions or masses. The supraglottic larynx including the epiglottis, petiole, arytenoids, glossoepiglottic, aryepiglottic and pharyngoepiglottic folds are normal without mucosal lesions, ulcerations or masses. The glottis reveals normal false vocal folds. The true vocal folds are glistening white, tense and of equal length, without paralysis, having symmetric mobility on adduction and abduction. There are no mucosal lesions, nodules, cysts, erythroplasia or leukoplakia. The posterior cricoid area has healthy pink mucosa in the interarytenoid area and esophageal inlet. There is mild thickening/edema of the interarytenoid mucosa suggestive of posterior laryngitis from laryngopharyngeal acid reflux disease. The trachea is clear without narrowing in the immediate subglottic region, without deviation or lesions. \par  [de-identified] : Multiple thyroid nodules and recurrent hyperparathyroidism s/p parathyroidectomy [FreeTextEntry1] : Impacted cerumen right external ear canal [FreeTextEntry6] : Impacted cerumen removed with curette and irrigation. Procedure well tolerated. ALEXIA bunch  AD

## 2021-06-14 LAB
25(OH)D3 SERPL-MCNC: 42.4 NG/ML
ALBUMIN SERPL ELPH-MCNC: 4.6 G/DL
ALP BLD-CCNC: 112 U/L
ALT SERPL-CCNC: 20 U/L
ANION GAP SERPL CALC-SCNC: 9 MMOL/L
AST SERPL-CCNC: 17 U/L
BILIRUB SERPL-MCNC: 0.2 MG/DL
BUN SERPL-MCNC: 19 MG/DL
CA-I SERPL-SCNC: 1.41 MMOL/L
CALCIUM SERPL-MCNC: 11 MG/DL
CALCIUM SERPL-MCNC: 11 MG/DL
CHLORIDE SERPL-SCNC: 105 MMOL/L
CO2 SERPL-SCNC: 26 MMOL/L
CREAT SERPL-MCNC: 0.73 MG/DL
GLUCOSE SERPL-MCNC: 80 MG/DL
PARATHYROID HORMONE INTACT: 40 PG/ML
POTASSIUM SERPL-SCNC: 4.3 MMOL/L
PROT SERPL-MCNC: 6.9 G/DL
SODIUM SERPL-SCNC: 140 MMOL/L
T4 FREE SERPL-MCNC: 1.2 NG/DL
TSH SERPL-ACNC: 0.87 UIU/ML

## 2021-06-18 LAB — PTH RELATED PROT SERPL-MCNC: <2 PMOL/L

## 2021-09-23 ENCOUNTER — NON-APPOINTMENT (OUTPATIENT)
Age: 76
End: 2021-09-23

## 2021-09-23 ENCOUNTER — APPOINTMENT (OUTPATIENT)
Dept: ENDOCRINOLOGY | Facility: CLINIC | Age: 76
End: 2021-09-23
Payer: MEDICARE

## 2021-09-23 VITALS
HEIGHT: 65 IN | SYSTOLIC BLOOD PRESSURE: 153 MMHG | HEART RATE: 69 BPM | DIASTOLIC BLOOD PRESSURE: 72 MMHG | BODY MASS INDEX: 28.99 KG/M2 | WEIGHT: 174 LBS

## 2021-09-23 LAB
GLUCOSE BLDC GLUCOMTR-MCNC: 122
HBA1C MFR BLD HPLC: 5.9

## 2021-09-23 PROCEDURE — 82962 GLUCOSE BLOOD TEST: CPT

## 2021-09-23 PROCEDURE — 83036 HEMOGLOBIN GLYCOSYLATED A1C: CPT | Mod: QW

## 2021-09-23 PROCEDURE — 99214 OFFICE O/P EST MOD 30 MIN: CPT | Mod: 25

## 2021-09-23 RX ORDER — AMOXICILLIN 500 MG/1
500 CAPSULE ORAL
Qty: 30 | Refills: 0 | Status: DISCONTINUED | COMMUNITY
Start: 2021-01-11 | End: 2021-09-23

## 2021-09-23 RX ORDER — ROSUVASTATIN CALCIUM 5 MG/1
5 TABLET, FILM COATED ORAL
Qty: 90 | Refills: 1 | Status: DISCONTINUED | COMMUNITY
Start: 2021-03-17 | End: 2021-09-23

## 2021-09-24 NOTE — HISTORY OF PRESENT ILLNESS
[FreeTextEntry1] : Ms. Stern is a 76 year-old woman with a history of type 2 diabetes mellitus, recurrent primary hyperparathyroidism, thyroid nodules, hypertension presenting for follow-up of her endocrine issues. I saw her for an initial visit in July 2020 and last in March 2021. She was referred by Dr. Bassam Bryant, who is managing her thyroid nodules. She is a retired nurse.\par \par Primary hyperparathyroidism.\par She was diagnosed with primary hyperparathyroidism in the 1990s and is status post parathyroid surgery in 2007.\par She developed recurrent primary hyperparathyroidism around 2017 with plan for a second parathyroid procedure. Imaging demonstrated a 1.2 cm left superior hyperplastic parathyroid gland adjacent to a surgical clip posterior to the mid-upper left thyroid lobe.\par No history of kidney stones. No nephrolithiasis on renal ultrasound in November 2019. 24 hour urine calcium excretion 192 mg in December 2019 (normal range typically  mg for women).\par No history of fracture. Bone density in December 2019 within range at the spine, hip, and wrist. \par She has yogurt daily. She has a [employer] multivitamin (calcium 450 mg, vitamin D 400 intl units) daily.  \par No history of radiation therapy. She was on and off hydrochlorothiazide in the past. No history of lithium use.\par Father with a history of primary hyperparathyroidism. No other endocrine tumors. \par \par Type 2 diabetes mellitus. Point-of-care HbA1c 5.9% and glucose 122 mg/dL today; HbA1c 6.1% in March 2021, 6.2% in July 2020. No known history of micro- or macrovascular complications.\par She was diagnosed with diabetes in 2015. No hospitalizations for hypo- or hyperglycemia.\par At her initial visit she was taking metformin 500 mg daily, increased to 500 mg twice daily in February 2021\par She is on a blood pressure regimen\par She is not on a statin for cholesterol\par Nephropathy screening: Treated with an angiotenin receptor blocker\par Last ophthalmology appointment: August 2021; every six months\par Last podiatry appointment: July 2021; every nine weeks\par Last dental appointment: September 2021; every six months\par She is up-to-date with COVID-19 (Pfizer) vaccine\par \par Interim History \par Laboratory results from last visit as below. LDL 98 mg/dL; statin therapy still recommended if LDL >70 mg/dL. Urine microalbumin within range. TSH/free T4, vitamin D, vitamin B12 within range. Plasma metanephrines within range.\par We started rosuvastatin but she discontinued a few weeks due to leg cramps. \par Recent laboratory results ordered by her primary care provider as below.\par She has seen cardiology for palpitations. \par Weight is down 9 pounds since last visit. No chest pain, shortness of breath, polyuria/polydipsia.\par Medical and surgical history, medications, allergies, social and family history reviewed and updated as needed.

## 2021-09-24 NOTE — DATA REVIEWED
[FreeTextEntry1] : Laboratories (August 13, 2021) reviewed and significant for: \par Unremarkable complete blood count\par Calcium 10.8 mg/dL (albumin 4.7 g/dL; normal: 8.7-10.3)\par Ionized calcium 6.2 mg/dL (normal: 4.5-5.6)\par PTH 29 pg/mL\par BUN/creatinine 12/0.76 mg/dL (eGFR 89 mL/min)\par HbA1c 6.4%\par TSH 0.685 uIU/mL\par LDL 43 mg/dL\par HDL 58 mg/dL\par Total cholesterol 115 mg/dL\par Triglycerides 68 mg/dL\par \par Laboratories (February 4, 2021) reviewed and significant for: \par Calcium 10.9 mg/dL (normal: 8.7-10.3)\par Ionized calcium 6.2 mg/dL (normal: 4.5-5.6)\par PTH 42 pg/mL\par BUN/creatinine 17/0.73 mg/dL (eGFR 94 mL/min)\par HbA1c 6.5%\par \par Most recent bone mineral density\par Date: December 10, 2019\par Source: [facility]\par Site: [facility]\par \par Site	BMD (g/cm2)	T-score	Change previous	Change baseline	\par Lumbar spine	1.080	-0.7\par Femoral neck	0.944	-0.7 (left)	\par Total hip	                0.946       -0.5 (left)         \par Distal radius           	0.684       -0.3         \par DXA Comments:

## 2021-09-24 NOTE — ASSESSMENT
[FreeTextEntry1] : Primary hyperparathyroidism. She was diagnosed with primary hyperparathyroidism in the 1990s and is status post parathyroid surgery in 2007. She developed recurrent primary hyperparathyroidism around 2017. She has hypercalcemia with inappropriately normal PTH concentrations consistent with primary hyperparathyroidism. Evaluation for other causes of hypercalcemia unremarkable. We discussed the complications of primary hyperparathyroidism, including but not limited to hypercalcemia, nephrolithiasis, and osteoporosis. We discussed the recommendations for calcium and vitamin D intake; there is no indication to restrict calcium intake in patients with primary hyperparathyroidism. She may be interested in genetic testing due to her family history of primary hyperparathyroidism in her father and personal history of recurrent disease. She is reconsidering whether she wants to proceed with parathyroid surgery at this time.\par Interval bone density testing\par Calcium 8709-7782 mg daily from diet and supplements (to be taken in divided doses as no more than 500-600 mg can be absorbed at one time); advised dietary calcium\par Continue current vitamin D regimen\par \par Type 2 diabetes mellitus. Point-of-care HbA1c 5.9% and glucose 122 mg/dL today. No known history of micro- or macrovascular complications. I congratulated her on her excellent glycemic control. We discussed the importance of diet and exercise and lifestyle modification for glycemic control.\par Continue metformin 500 mg twice daily\par She is on a blood pressure regimen; blood pressure elevated today and will monitor\par She is not on a statin for cholesterol; we discussed the risks and benefits, including but not limited to arthralgias and myalgias. We will start pravastatin 10 mg daily (drug interactions with atorvastatin and simvastatin).\par Nephropathy screening: Treated with an angiotenin receptor blocker\par Last ophthalmology appointment: August 2021; every six months\par Last podiatry appointment: July 2021; every nine weeks\par Last dental appointment: September 2021; every six months\par She is up-to-date with COVID-19 (Pfizer) vaccine\par \par Return to see me in 6 months or earlier as needed. Patient advised to call earlier with significant hypo- or hyperglycemia. \par \par CC:\par Dr. Katheryn Alexis, Fax [phone]\par Dr. Alfredo Bowen, Fax [phone]

## 2021-09-24 NOTE — PHYSICAL EXAM
[Alert] : alert [Healthy Appearance] : healthy appearance [No Acute Distress] : no acute distress [Normal Sclera/Conjunctiva] : normal sclera/conjunctiva [No Respiratory Distress] : no respiratory distress [Normal S1, S2] : normal S1 and S2 [No Stigmata of Cushings Syndrome] : no stigmata of Cushings Syndrome [Normal Gait] : normal gait [Normal Insight/Judgement] : insight and judgment were intact [Normal Hearing] : hearing was normal [Well Healed Scar] : well healed scar [Kyphosis] : no kyphosis present [de-identified] : no moon facies, no supraclavicular fat pads [de-identified] : Foot examination performed in February 2021

## 2021-10-05 ENCOUNTER — APPOINTMENT (OUTPATIENT)
Dept: OTOLARYNGOLOGY | Facility: CLINIC | Age: 76
End: 2021-10-05

## 2022-03-24 ENCOUNTER — APPOINTMENT (OUTPATIENT)
Dept: ENDOCRINOLOGY | Facility: CLINIC | Age: 77
End: 2022-03-24
Payer: MEDICARE

## 2022-03-24 VITALS
HEART RATE: 89 BPM | DIASTOLIC BLOOD PRESSURE: 77 MMHG | BODY MASS INDEX: 28.12 KG/M2 | SYSTOLIC BLOOD PRESSURE: 161 MMHG | WEIGHT: 175 LBS | HEIGHT: 66 IN

## 2022-03-24 LAB
GLUCOSE BLDC GLUCOMTR-MCNC: 89
HBA1C MFR BLD HPLC: 5.9

## 2022-03-24 PROCEDURE — 83036 HEMOGLOBIN GLYCOSYLATED A1C: CPT | Mod: QW

## 2022-03-24 PROCEDURE — 99214 OFFICE O/P EST MOD 30 MIN: CPT | Mod: 25

## 2022-03-24 PROCEDURE — 82962 GLUCOSE BLOOD TEST: CPT

## 2022-03-24 RX ORDER — METFORMIN HYDROCHLORIDE 500 MG/1
500 TABLET, COATED ORAL
Refills: 0 | Status: DISCONTINUED | COMMUNITY
End: 2022-03-24

## 2022-04-11 PROBLEM — J04.0 REFLUX LARYNGITIS: Status: ACTIVE | Noted: 2019-12-03

## 2022-05-31 ENCOUNTER — TRANSCRIPTION ENCOUNTER (OUTPATIENT)
Age: 77
End: 2022-05-31

## 2022-06-09 NOTE — ASSESSMENT
[FreeTextEntry1] : Primary hyperparathyroidism. She was diagnosed with primary hyperparathyroidism in the 1990s and is status post parathyroid surgery in 2007. She developed recurrent primary hyperparathyroidism around 2017. She has hypercalcemia with inappropriately normal PTH concentrations consistent with primary hyperparathyroidism. Evaluation for other causes of hypercalcemia unremarkable. We discussed the complications of primary hyperparathyroidism, including but not limited to hypercalcemia, nephrolithiasis, and osteoporosis. We discussed the recommendations for calcium and vitamin D intake; there is no indication to restrict calcium intake in patients with primary hyperparathyroidism. She may be interested in genetic testing due to her family history of primary hyperparathyroidism in her father and personal history of recurrent disease. She is reconsidering whether she wants to proceed with parathyroid surgery at this time.\par Interval bone density testing\par Calcium 9928-9848 mg daily from diet and supplements (to be taken in divided doses as no more than 500-600 mg can be absorbed at one time)\par Continue current vitamin D regimen\par \par Type 2 diabetes mellitus. Point-of-care HbA1c 5.9% and glucose 89 mg/dL today. No known history of micro- or macrovascular complications. I congratulated her on her excellent glycemic control. She prefers changing from immediate release to extended release metformin. We discussed the importance of diet and exercise and lifestyle modification for glycemic control.\par Adjust metformin to ER 1000 mg daily\par She is on a blood pressure regimen; blood pressure elevated today but ambulatory values within range\par She is not on a statin for cholesterol; deferred to cardiology\par Nephropathy screening: Treated with an angiotenin receptor blocker\par Last ophthalmology appointment: February 2022; every six months\par Last podiatry appointment: February 2022; every nine weeks\par Last dental appointment: September 2021; every six months\par She is up-to-date with pneumonia and COVID-19 (Pfizer) vaccines\par \par Return to see me in 6 months or earlier as needed. Patient advised to call earlier with significant hypo- or hyperglycemia. \par \par CC:\par Dr. Stephanie King, Fax [phone]\par Dr. Alfredo Bowen, Fax [phone]

## 2022-06-09 NOTE — DATA REVIEWED
[FreeTextEntry1] : Laboratories (February 14, 2022) reviewed and significant for: \par Calcium 10.9 mg/dL (albumin 4.6 g/dL; normal: 8.7-10.3)\par BUN/creatinine 15/0.74 mg/dL (eGFR 92 mL/min)\par HbA1c 5.9%\par  mg/dL\par HDL 56 mg/dL\par Total cholesterol 190 mg/dL\par Triglycerides 126 mg/dL\par \par Laboratories (December 28, 2021) reviewed and significant for: \par Calcium 10.7 mg/dL (albumin 4.7 g/dL; normal: 8.7-10.3)\par PTH 29 pg/mL (normal: 15-65)\par BUN/creatinine 13/0.78 mg/dL (eGFR 87 mL/min)\par \par Most recent bone mineral density\par Date: December 10, 2019\par Source: [facility]\par Site: [facility]\par \par Site	BMD (g/cm2)	T-score	Change previous	Change baseline	\par Lumbar spine	1.080	-0.7\par Femoral neck	0.944	-0.7 (left)	\par Total hip	                0.946       -0.5 (left)         \par Distal radius           	0.684       -0.3         \par DXA Comments:

## 2022-06-09 NOTE — HISTORY OF PRESENT ILLNESS
[FreeTextEntry1] : Ms. Stern is a 76 year-old woman with a history of type 2 diabetes mellitus, recurrent primary hyperparathyroidism, thyroid nodules, hypertension presenting for follow-up of her endocrine issues. I saw her for an initial visit in July 2020 and last in September 2021. She was referred by Dr. Bassam Bryant, who is managing her thyroid nodules. She is a retired nurse.\par \par Primary hyperparathyroidism.\par She was diagnosed with primary hyperparathyroidism in the 1990s and is status post parathyroid surgery in 2007.\par She developed recurrent primary hyperparathyroidism around 2017 with plan for a second parathyroid procedure. Imaging demonstrated a 1.2 cm left superior hyperplastic parathyroid gland adjacent to a surgical clip posterior to the mid-upper left thyroid lobe.\par No history of kidney stones. No nephrolithiasis on renal ultrasound in November 2019. 24 hour urine calcium excretion 192 mg in December 2019 (normal range typically  mg for women).\par No history of fracture. Bone density in December 2019 within range at the spine, hip, and wrist. \par She has yogurt daily. She has a [employer] multivitamin (calcium 450 mg, vitamin D 400 intl units) daily.  \par No history of radiation therapy. She was on and off hydrochlorothiazide in the past. No history of lithium use.\par Father with a history of primary hyperparathyroidism. No other endocrine tumors. \par \par Type 2 diabetes mellitus. Point-of-care HbA1c 5.9% and glucose 89 mg/dL today; HbA1c 5.9% in September 2021, 6.1% in March 2021, 6.2% in July 2020. No known history of micro- or macrovascular complications.\par She was diagnosed with diabetes in 2015. No hospitalizations for hypo- or hyperglycemia.\par At her initial visit she was taking metformin 500 mg daily, increased to 500 mg twice daily in February 2021\par She is on a blood pressure regimen\par She is not on a statin for cholesterol; she did not tolerate rosuvastatin or pravastatin and there are drug interactions with atorvastatin and simvastatin\par Nephropathy screening: Treated with an angiotenin receptor blocker\par Last ophthalmology appointment: February 2022; every six months\par Last podiatry appointment: February 2022; every nine weeks\par Last dental appointment: September 2021; every six months\par She is up-to-date with pneumonia and COVID-19 (Pfizer) vaccines\par \par Interim History \par Last visit we discussed starting pravastatin; she states she had malaise with therapy and has not been taking. \par She has had tinnitus and lightheadedness; she was evaluated by an otorhinolaryngologist.\par She has had some recent loose stools and will schedule an appointment with her gastroenterologist. \par She has followed with her cardiologist.\par Recent thyroid ultrasound ordered by Dr. Bassam Bryant overall stable from previous.\par Recent laboratory results ordered by her primary care provider as below.\par Weight is overall stable since last visit. No chest pain, shortness of breath, polyuria/polydipsia.\par Medical and surgical history, medications, allergies, social and family history reviewed and updated as needed.

## 2022-06-09 NOTE — PHYSICAL EXAM
[Alert] : alert [Healthy Appearance] : healthy appearance [No Acute Distress] : no acute distress [Normal Sclera/Conjunctiva] : normal sclera/conjunctiva [Normal Hearing] : hearing was normal [Well Healed Scar] : well healed scar [No Respiratory Distress] : no respiratory distress [Normal S1, S2] : normal S1 and S2 [No Stigmata of Cushings Syndrome] : no stigmata of Cushings Syndrome [Normal Gait] : normal gait [Normal Insight/Judgement] : insight and judgment were intact [Kyphosis] : no kyphosis present [de-identified] : no moon facies, no supraclavicular fat pads [de-identified] : Foot examination performed in February 2021

## 2022-06-09 NOTE — ADDENDUM
[FreeTextEntry1] : Recent bone density as below; discussed with Ms. Stern. Bone density was in the normal range at the spine and hip sites and in the osteopenic range at the distal radius; there is a predilection for cortisol bone loss with primary hyperparathyroidism. While not directly comparable, bone density appeared overall stable at the spine and hip sites; the decline at the distal radius may be in large part due to positioning. Vertebral fracture assessment with possible mild wedging at T5 and T7 and recommend radiographs for further evaluation. Her 10 year fracture risk calculated by [employer] is 8.5% for major osteoporotic fracture and 1.0% for hip fracture in the absence of vertebral fractures, below the treatment thresholds. 5/31/22\par \par Recent thoracic radiographs without evidence of fracture; discussed with Ms. Stern. 6/09/22\par \par Thoracic spine X-rays (June 6, 2022) reviewed and significant for:\par 1. No evidence of fracture or subluxation.\par 2. The bones are generally osteopenic in appearance.\par 3. Mild degenerative spondylosis throughout the mid and lower thoracic spine.\par 4. Mild S-shaped scoliosis of the thoracolumbar spine.\par \par Most recent bone mineral density\par Date: May 19, 2022\par Source: Hologic\par Site: [facility]\par \par Site	BMD (g/cm2)	T-score	Change previous	Change baseline	\par Lumbar spine	0.917	-0.9\par Femoral neck - L	0.783	-0.6\par Femoral neck - R	0.856	+0.1\par Total hip - L	0.889	-0.4\par Total hip - R	0.914	-0.2\par Total hip	                0.946       -0.5         \par Distal radius           	0.580       -1.9       \par DXA Comments:

## 2022-11-28 ENCOUNTER — NON-APPOINTMENT (OUTPATIENT)
Age: 77
End: 2022-11-28

## 2022-11-29 ENCOUNTER — APPOINTMENT (OUTPATIENT)
Dept: ENDOCRINOLOGY | Facility: CLINIC | Age: 77
End: 2022-11-29
Payer: MEDICARE

## 2022-11-29 VITALS
RESPIRATION RATE: 92 BRPM | SYSTOLIC BLOOD PRESSURE: 107 MMHG | WEIGHT: 172 LBS | DIASTOLIC BLOOD PRESSURE: 66 MMHG | HEART RATE: 76 BPM | BODY MASS INDEX: 27.76 KG/M2

## 2022-11-29 LAB
25(OH)D3 SERPL-MCNC: 40.3 NG/ML
ALBUMIN SERPL ELPH-MCNC: 4.5 G/DL
ALP BLD-CCNC: 119 U/L
ALT SERPL-CCNC: 18 U/L
ANION GAP SERPL CALC-SCNC: 10 MMOL/L
AST SERPL-CCNC: 16 U/L
BASOPHILS # BLD AUTO: 0.03 K/UL
BASOPHILS NFR BLD AUTO: 0.3 %
BILIRUB SERPL-MCNC: 0.3 MG/DL
BUN SERPL-MCNC: 14 MG/DL
CALCIUM SERPL-MCNC: 11.2 MG/DL
CALCIUM SERPL-MCNC: 11.2 MG/DL
CHLORIDE SERPL-SCNC: 104 MMOL/L
CHOLEST SERPL-MCNC: 183 MG/DL
CO2 SERPL-SCNC: 27 MMOL/L
CREAT SERPL-MCNC: 0.75 MG/DL
EGFR: 83 ML/MIN/1.73M2
EOSINOPHIL # BLD AUTO: 0.12 K/UL
EOSINOPHIL NFR BLD AUTO: 1.4 %
GLUCOSE BLDC GLUCOMTR-MCNC: 92
GLUCOSE SERPL-MCNC: 97 MG/DL
HBA1C MFR BLD HPLC: 5.9
HCT VFR BLD CALC: 40.7 %
HDLC SERPL-MCNC: 65 MG/DL
HGB BLD-MCNC: 13.4 G/DL
IMM GRANULOCYTES NFR BLD AUTO: 0.2 %
LDLC SERPL CALC-MCNC: 97 MG/DL
LYMPHOCYTES # BLD AUTO: 2.06 K/UL
LYMPHOCYTES NFR BLD AUTO: 23.5 %
MAGNESIUM SERPL-MCNC: 2.3 MG/DL
MAN DIFF?: NORMAL
MCHC RBC-ENTMCNC: 30.7 PG
MCHC RBC-ENTMCNC: 32.9 GM/DL
MCV RBC AUTO: 93.1 FL
MONOCYTES # BLD AUTO: 0.64 K/UL
MONOCYTES NFR BLD AUTO: 7.3 %
NEUTROPHILS # BLD AUTO: 5.89 K/UL
NEUTROPHILS NFR BLD AUTO: 67.3 %
NONHDLC SERPL-MCNC: 118 MG/DL
PARATHYROID HORMONE INTACT: 41 PG/ML
PHOSPHATE SERPL-MCNC: 3.6 MG/DL
PLATELET # BLD AUTO: 326 K/UL
POTASSIUM SERPL-SCNC: 4.4 MMOL/L
PROT SERPL-MCNC: 7 G/DL
RBC # BLD: 4.37 M/UL
RBC # FLD: 13.6 %
SODIUM SERPL-SCNC: 141 MMOL/L
TRIGL SERPL-MCNC: 103 MG/DL
TSH SERPL-ACNC: 1.04 UIU/ML
VIT B12 SERPL-MCNC: 822 PG/ML
WBC # FLD AUTO: 8.76 K/UL

## 2022-11-29 PROCEDURE — 99204 OFFICE O/P NEW MOD 45 MIN: CPT | Mod: 25

## 2022-11-29 PROCEDURE — 82962 GLUCOSE BLOOD TEST: CPT

## 2022-11-29 PROCEDURE — 99214 OFFICE O/P EST MOD 30 MIN: CPT | Mod: 25

## 2022-11-29 PROCEDURE — 83036 HEMOGLOBIN GLYCOSYLATED A1C: CPT | Mod: QW

## 2022-11-29 RX ORDER — PRAVASTATIN SODIUM 10 MG/1
10 TABLET ORAL DAILY
Qty: 90 | Refills: 2 | Status: DISCONTINUED | COMMUNITY
Start: 2021-09-23 | End: 2022-11-29

## 2022-11-30 ENCOUNTER — TRANSCRIPTION ENCOUNTER (OUTPATIENT)
Age: 77
End: 2022-11-30

## 2022-11-30 LAB
CREAT SPEC-SCNC: 24 MG/DL
MICROALBUMIN 24H UR DL<=1MG/L-MCNC: <1.2 MG/DL
MICROALBUMIN/CREAT 24H UR-RTO: NORMAL MG/G

## 2023-07-17 NOTE — HISTORY OF PRESENT ILLNESS
[FreeTextEntry1] : Ms. Stern is a 77 year-old woman with a history of type 2 diabetes mellitus, recurrent primary hyperparathyroidism, thyroid nodules, hypertension presenting for follow-up of her endocrine issues. I saw her for an initial visit in July 2020 and last in March 2022. She was referred by Dr. Bassam Bryant, who is managing her thyroid nodules. She is a retired nurse.\par \par Primary hyperparathyroidism. Osteoporosis.\par She was diagnosed with primary hyperparathyroidism in the 1990s and is status post parathyroid surgery in 2007.\par She developed recurrent primary hyperparathyroidism around 2017 with plan for a second parathyroid procedure. Imaging demonstrated a 1.2 cm left superior hyperplastic parathyroid gland adjacent to a surgical clip posterior to the mid-upper left thyroid lobe.\par No history of kidney stones. No nephrolithiasis on renal ultrasound in November 2019. 24 hour urine calcium excretion 192 mg in December 2019 (normal range typically  mg for women).\par She has a history of left wrist fracture in October 2022 in a fall from standing height. Bone density testing in May 2022 with osteopenia at the distal radius. No evidence of vertebral fracture on thoracic spine radiographs in June 2022.\par She has yogurt daily. She has a [employer] multivitamin (calcium 450 mg, vitamin D 400 intl units) daily.  \par No history of radiation therapy. She was on and off hydrochlorothiazide in the past. No history of lithium use.\par Father with a history of primary hyperparathyroidism. No other endocrine tumors. No parental history of hip fracture.\par \par Type 2 diabetes mellitus. Point-of-care HbA1c 5.9% and glucose 92 mg/dL today; HbA1c 5.9% in March 2022, 5.9% in September 2021, 6.1% in March 2021, 6.2% in July 2020. No known history of micro- or macrovascular complications.\par She was diagnosed with diabetes in 2015. No hospitalizations for hypo- or hyperglycemia.\par At her initial visit she was taking metformin 500 mg daily, increased to 500 mg twice daily in February 2021; we adjusted her regimen to metformin ER 1000 mg twice daily in March 2022.\par She is on a blood pressure regimen\par She is not on a statin for cholesterol; she did not tolerate rosuvastatin or pravastatin and there are drug interactions with atorvastatin and simvastatin\par Nephropathy screening: Treated with an angiotenin receptor blocker\par Last ophthalmology appointment: October 2022; every six months\par Last podiatry appointment: October 2022; every nine weeks\par Last dental appointment: Every six months with upcoming appointment\par She is up-to-date with influenza, pneumonia, and COVID-19 (Pfizer) vaccines up to the bivalent booster\par \par Interim History \par Recent bone density as below. Bone density was in the normal range at the spine and hip sites and in the osteopenic range at the distal radius; there is a predilection for cortisol bone loss with primary hyperparathyroidism. While not directly comparable, bone density appeared overall stable at the spine and hip sites; the decline at the distal radius may be in part to positioning. Vertebral fracture assessment with possible mild wedging at T5 and T7 and recommended radiographs for further evaluation. Subsequent thoracic radiographs without evidence of fracture.\par She had a fall at the end of October on the sidewalk with a head injury. She was evaluated at [facility]. She sustained a left wrist fracture in the fall from standing height. She is scheduled for upcoming physical therapy for her wrist fracture and subsequent physical therapy for balance.\par She has followed with her cardiologist. \par Weight is down 3 pounds since last visit. No chest pain, shortness of breath, polyuria/polydipsia.\par Medical and surgical history, medications, allergies, social and family history reviewed and updated as needed.

## 2023-07-17 NOTE — PHYSICAL EXAM
[Alert] : alert [Healthy Appearance] : healthy appearance [No Acute Distress] : no acute distress [Normal Sclera/Conjunctiva] : normal sclera/conjunctiva [Normal Hearing] : hearing was normal [Well Healed Scar] : well healed scar [No Respiratory Distress] : no respiratory distress [Normal S1, S2] : normal S1 and S2 [No Stigmata of Cushings Syndrome] : no stigmata of Cushings Syndrome [Normal Gait] : normal gait [Normal Insight/Judgement] : insight and judgment were intact [Kyphosis] : no kyphosis present [de-identified] : no moon facies, no supraclavicular fat pads [de-identified] : Foot examination performed in February 2021

## 2023-07-17 NOTE — ADDENDUM
[FreeTextEntry1] : Recent laboratory results as below; discussed with Ms. Stern. Serum calcium elevated within 1 mg/dL above the upper normal limit. Lipid panel around goal. Urine microalbumin within range. Vitamin D within the goal range. Other test results within range. 11/30/22

## 2023-07-17 NOTE — DATA REVIEWED
[FreeTextEntry1] : Laboratories (February 14, 2022) reviewed and significant for: \par Calcium 10.9 mg/dL (albumin 4.6 g/dL; normal: 8.7-10.3)\par BUN/creatinine 15/0.74 mg/dL (eGFR 92 mL/min)\par HbA1c 5.9%\par  mg/dL\par HDL 56 mg/dL\par Total cholesterol 190 mg/dL\par Triglycerides 126 mg/dL\par \par Thoracic spine X-rays (June 6, 2022) reviewed and significant for:\par 1. No evidence of fracture or subluxation.\par 2. The bones are generally osteopenic in appearance.\par 3. Mild degenerative spondylosis throughout the mid and lower thoracic spine.\par 4. Mild S-shaped scoliosis of the thoracolumbar spine.\par \par Most recent bone mineral density\par Date: May 19, 2022\par Source: Hologic\par Site: [facility]\par \par Site	BMD (g/cm2)	T-score	Change previous	Change baseline	\par Lumbar spine	0.917	-0.9\par Femoral neck - L	0.783	-0.6\par Femoral neck - R	0.856	+0.1\par Total hip - L	0.889	-0.4\par Total hip - R	0.914	-0.2\par Total hip	                0.946       -0.5         \par Distal radius           	0.580       -1.9       \par DXA Comments: \par \par Date: December 10, 2019\par Source: [employer]\par Site: [facility]\par \par Site	BMD (g/cm2)	T-score	Change previous	Change baseline	\par Lumbar spine	1.080	-0.7\par Femoral neck	0.944	-0.7 (left)	\par Total hip	                0.946       -0.5 (left)         \par Distal radius           	0.684       -0.3         \par DXA Comments:

## 2023-07-17 NOTE — ASSESSMENT
[Bisphosphonates] : The patient was instructed to take bisphosphonates on an empty stomach with a full glass of water,and wait at least 30 minutes before eating or lying down [FreeTextEntry1] : Primary hyperparathyroidism. Osteoporosis. She was diagnosed with primary hyperparathyroidism in the 1990s and is status post parathyroid surgery in 2007. She developed recurrent primary hyperparathyroidism around 2017. She has hypercalcemia with inappropriately normal PTH concentrations consistent with primary hyperparathyroidism. Evaluation for other causes of hypercalcemia unremarkable. We have discussed the complications of primary hyperparathyroidism, including but not limited to hypercalcemia, nephrolithiasis, and osteoporosis. Despite densitometric osteopenia, her diagnosis is osteoporosis in the setting of a low trauma wrist fracture. We discussed the potential benefits and risks of antiresorptive osteoporosis therapy, including but not limited to osteonecrosis of the jaw and atypical femoral fracture. She is amenable to therapy with alendronate. We reviewed proper use and compliance with alendronate. We discussed the recommendations for calcium and vitamin D intake; there is no indication to restrict calcium intake in patients with primary hyperparathyroidism. She may be interested in genetic testing due to her family history of primary hyperparathyroidism in her father and personal history of recurrent disease. She is reconsidering whether she wants to proceed with parathyroid surgery at this time.\par Monitor calciotropic panel\par Start alendronate 70 mg weekly\par Calcium 6439-5444 mg daily from diet and supplements (to be taken in divided doses as no more than 500-600 mg can be absorbed at one time)\par Continue current vitamin D regimen pending level\par \par Type 2 diabetes mellitus. Point-of-care HbA1c 5.9% and glucose 92 mg/dL today. No known history of micro- or macrovascular complications. I congratulated her on her excellent glycemic control. We have discussed the importance of diet and exercise and lifestyle modification for glycemic control. She is tolerating her current regimen and we will continue.\par Continue metformin ER 1000 mg daily\par She is on a blood pressure regimen; blood pressure around goal\par She is not on a statin for cholesterol; defer to cardiology\par Nephropathy screening: Treated with an angiotenin receptor blocker\par Last ophthalmology appointment: October 2022; every six months\par Last podiatry appointment: October 2022; every nine weeks\par Last dental appointment: Every six months with upcoming appointment\par She is up-to-date with influenza, pneumonia, and COVID-19 (Pfizer) vaccines up to the bivalent booster\par \par Return to see me in 6 months or earlier as needed. Patient advised to call earlier with significant hypo- or hyperglycemia. \par \par CC:\par Dr. Stephanie King, Fax [phone]\par Dr. Alfredo Bowen, Fax [phone]\par Dr. Em Buenrostro, Fax [phone]

## 2023-08-01 ENCOUNTER — APPOINTMENT (OUTPATIENT)
Dept: OTOLARYNGOLOGY | Facility: CLINIC | Age: 78
End: 2023-08-01

## 2024-01-16 ENCOUNTER — APPOINTMENT (OUTPATIENT)
Dept: ENDOCRINOLOGY | Facility: CLINIC | Age: 79
End: 2024-01-16
Payer: MEDICARE

## 2024-01-16 VITALS
HEART RATE: 79 BPM | SYSTOLIC BLOOD PRESSURE: 166 MMHG | HEIGHT: 65 IN | BODY MASS INDEX: 29.49 KG/M2 | WEIGHT: 177 LBS | DIASTOLIC BLOOD PRESSURE: 79 MMHG

## 2024-01-16 DIAGNOSIS — M81.0 AGE-RELATED OSTEOPOROSIS W/OUT CURRENT PATHOLOGICAL FRACTURE: ICD-10-CM

## 2024-01-16 DIAGNOSIS — E11.9 TYPE 2 DIABETES MELLITUS W/OUT COMPLICATIONS: ICD-10-CM

## 2024-01-16 DIAGNOSIS — E21.0 PRIMARY HYPERPARATHYROIDISM: ICD-10-CM

## 2024-01-16 DIAGNOSIS — E55.9 VITAMIN D DEFICIENCY, UNSPECIFIED: ICD-10-CM

## 2024-01-16 LAB — GLUCOSE BLDC GLUCOMTR-MCNC: 89

## 2024-01-16 PROCEDURE — 82962 GLUCOSE BLOOD TEST: CPT

## 2024-01-16 PROCEDURE — 99215 OFFICE O/P EST HI 40 MIN: CPT | Mod: 25

## 2024-01-16 RX ORDER — ALENDRONATE SODIUM 70 MG/1
70 TABLET ORAL
Qty: 12 | Refills: 3 | Status: ACTIVE | COMMUNITY
Start: 2022-11-29 | End: 1900-01-01

## 2024-01-16 RX ORDER — METFORMIN ER 500 MG 500 MG/1
500 TABLET ORAL
Qty: 180 | Refills: 2 | Status: ACTIVE | COMMUNITY
Start: 2022-03-24

## 2024-01-16 RX ORDER — LANCETS 33 GAUGE
EACH MISCELLANEOUS
Qty: 1 | Refills: 3 | Status: ACTIVE | COMMUNITY

## 2024-01-16 RX ORDER — BLOOD-GLUCOSE METER
KIT MISCELLANEOUS
Qty: 1 | Refills: 3 | Status: ACTIVE | COMMUNITY
Start: 2022-03-24

## 2024-01-17 LAB
25(OH)D3 SERPL-MCNC: 53.7 NG/ML
ALBUMIN SERPL ELPH-MCNC: 4.7 G/DL
ALP BLD-CCNC: 112 U/L
ALT SERPL-CCNC: 16 U/L
ANION GAP SERPL CALC-SCNC: 11 MMOL/L
AST SERPL-CCNC: 17 U/L
BASOPHILS # BLD AUTO: 0.04 K/UL
BASOPHILS NFR BLD AUTO: 0.4 %
BILIRUB SERPL-MCNC: 0.3 MG/DL
BUN SERPL-MCNC: 13 MG/DL
CALCIUM SERPL-MCNC: 10.9 MG/DL
CALCIUM SERPL-MCNC: 10.9 MG/DL
CHLORIDE SERPL-SCNC: 102 MMOL/L
CHOLEST SERPL-MCNC: 192 MG/DL
CO2 SERPL-SCNC: 25 MMOL/L
CREAT SERPL-MCNC: 0.78 MG/DL
CREAT SPEC-SCNC: 43 MG/DL
EGFR: 79 ML/MIN/1.73M2
EOSINOPHIL # BLD AUTO: 0.1 K/UL
EOSINOPHIL NFR BLD AUTO: 1.1 %
ESTIMATED AVERAGE GLUCOSE: 128 MG/DL
GLUCOSE SERPL-MCNC: 95 MG/DL
HBA1C MFR BLD HPLC: 6.1 %
HCT VFR BLD CALC: 43.3 %
HDLC SERPL-MCNC: 63 MG/DL
HGB BLD-MCNC: 13.7 G/DL
IMM GRANULOCYTES NFR BLD AUTO: 0.2 %
LDLC SERPL CALC-MCNC: 110 MG/DL
LYMPHOCYTES # BLD AUTO: 2.16 K/UL
LYMPHOCYTES NFR BLD AUTO: 23 %
MAGNESIUM SERPL-MCNC: 2.3 MG/DL
MAN DIFF?: NORMAL
MCHC RBC-ENTMCNC: 30.4 PG
MCHC RBC-ENTMCNC: 31.6 GM/DL
MCV RBC AUTO: 96.2 FL
MICROALBUMIN 24H UR DL<=1MG/L-MCNC: <1.2 MG/DL
MICROALBUMIN/CREAT 24H UR-RTO: NORMAL MG/G
MONOCYTES # BLD AUTO: 0.7 K/UL
MONOCYTES NFR BLD AUTO: 7.5 %
NEUTROPHILS # BLD AUTO: 6.36 K/UL
NEUTROPHILS NFR BLD AUTO: 67.8 %
NONHDLC SERPL-MCNC: 129 MG/DL
PARATHYROID HORMONE INTACT: 49 PG/ML
PHOSPHATE SERPL-MCNC: 3.3 MG/DL
PLATELET # BLD AUTO: 332 K/UL
POTASSIUM SERPL-SCNC: 4.2 MMOL/L
PROT SERPL-MCNC: 7.1 G/DL
RBC # BLD: 4.5 M/UL
RBC # FLD: 14.1 %
SODIUM SERPL-SCNC: 139 MMOL/L
TRIGL SERPL-MCNC: 107 MG/DL
TSH SERPL-ACNC: 0.9 UIU/ML
VIT B12 SERPL-MCNC: 972 PG/ML
WBC # FLD AUTO: 9.38 K/UL

## 2024-01-17 NOTE — HISTORY OF PRESENT ILLNESS
[FreeTextEntry1] : Ms. Stern is a 78 year-old woman with a history of type 2 diabetes mellitus, recurrent primary hyperparathyroidism, thyroid nodules, hypertension presenting for follow-up of her endocrine issues. I saw her for an initial visit in July 2020 and last in November 2022. She was referred by Dr. Bassam Bryant, who is managing her thyroid nodules. She is a retired nurse.  Primary hyperparathyroidism. Osteoporosis. She was diagnosed with primary hyperparathyroidism in the 1990s and is status post parathyroid surgery in 2007. She developed recurrent primary hyperparathyroidism around 2017 with plan for a second parathyroid procedure. Imaging demonstrated a 1.2 cm left superior hyperplastic parathyroid gland adjacent to a surgical clip posterior to the mid-upper left thyroid lobe. No history of kidney stones. No nephrolithiasis on renal ultrasound in November 2019. 24 hour urine calcium excretion 192 mg in December 2019 (normal range typically  mg for women). She has a history of left wrist fracture in October 2022 in a fall from standing height. Bone density testing in May 2022 with osteopenia at the distal radius. No evidence of vertebral fracture on thoracic spine radiographs in June 2022. Last visit we discussed initiation of alendronate, however, she has not started yet.  She has yogurt daily. She has a [employer] multivitamin (calcium 450 mg, vitamin D 400 intl units) daily.   No history of radiation therapy. She was on and off hydrochlorothiazide in the past. No history of lithium use. Father with a history of primary hyperparathyroidism. No other endocrine tumors. No parental history of hip fracture.  Type 2 diabetes mellitus. HbA1c 6.2% in May 2023 and glucose 89 mg/dL today; HbA1c 5.9% in March 2022, 5.9% in September 2021, 6.1% in March 2021, 6.2% in July 2020. No known history of micro- or macrovascular complications. She was diagnosed with diabetes in 2015. No hospitalizations for hypo- or hyperglycemia. At her initial visit she was taking metformin 500 mg daily, increased to 500 mg twice daily in February 2021; we adjusted her regimen to metformin ER 1000 mg twice daily in March 2022. She is on a blood pressure regimen She is not on a statin for cholesterol; she did not tolerate rosuvastatin or pravastatin and there are drug interactions with atorvastatin and simvastatin Nephropathy screening: Treated with an angiotensin receptor blocker Last ophthalmology appointment: January 2024; annual Last podiatry appointment: December 2023; every nine weeks Last dental appointment: Ongoing dental work  Interim History  She has been completing ongoing dental work. She has had dietary indiscretions, but has recently decreased carbohydrate intake and resumed exercise. She has followed with her cardiologist. She has had occasional lightheadedness when she wakes up. No chest pain, shortness of breath, polyuria/polydipsia. Medical and surgical history, medications, allergies, social and family history reviewed and updated as needed.

## 2024-01-17 NOTE — PHYSICAL EXAM
[Alert] : alert [Healthy Appearance] : healthy appearance [No Acute Distress] : no acute distress [Normal Sclera/Conjunctiva] : normal sclera/conjunctiva [Normal Hearing] : hearing was normal [Well Healed Scar] : well healed scar [No Respiratory Distress] : no respiratory distress [Normal S1, S2] : normal S1 and S2 [No Stigmata of Cushings Syndrome] : no stigmata of Cushings Syndrome [Normal Gait] : normal gait [Normal Insight/Judgement] : insight and judgment were intact [Kyphosis] : no kyphosis present [Acanthosis Nigricans] : no acanthosis nigricans [de-identified] : no moon facies, no supraclavicular fat pads

## 2024-01-17 NOTE — DATA REVIEWED
[FreeTextEntry1] : Laboratories (May 15, 2023) reviewed and significant for:  Calcium 10.9 mg/dL (albumin 4.6 g/dL; normal: 8.7-10.3) PTH 47 pg/mL (normal: 15-65) BUN/creatinine 18/0.68 mg/dL (eGFR 90 mL/min) Alkaline phosphatase 131 U/L (normal: ) HbA1c 6.2% TSH 0.562 uIU/mL  mg/dL HDL 59 mg/dL Total cholesterol 184 mg/dL Triglycerides 115 mg/dL Urine microalbumin <8 mg/g creatinine   Thoracic spine X-rays (June 6, 2022) reviewed and significant for: 1. No evidence of fracture or subluxation. 2. The bones are generally osteopenic in appearance. 3. Mild degenerative spondylosis throughout the mid and lower thoracic spine. 4. Mild S-shaped scoliosis of the thoracolumbar spine.  Most recent bone mineral density Date: May 19, 2022 Source: [employer] Site: [facility]	BMD (g/cm2)	T-score	Change previous	Change baseline	 Lumbar spine	0.917	-0.9 Femoral neck - L	0.783	-0.6 Femoral neck - R	0.856	+0.1 Total hip - L	0.889	-0.4 Total hip - R	0.914	-0.2 Total hip	                0.946       -0.5          Distal radius           	0.580       -1.9        DXA Comments:   Date: December 10, 2019 Source: [employer] Site: [facility]	BMD (g/cm2)	T-score	Change previous	Change baseline	 Lumbar spine	1.080	-0.7 Femoral neck	0.944	-0.7 (left)	 Total hip	                0.946       -0.5 (left)          Distal radius           	0.684       -0.3          DXA Comments:

## 2024-01-17 NOTE — ASSESSMENT
[Bisphosphonates] : The patient was instructed to take bisphosphonates on an empty stomach with a full glass of water,and wait at least 30 minutes before eating or lying down [FreeTextEntry1] : Primary hyperparathyroidism. Osteoporosis. She was diagnosed with primary hyperparathyroidism in the 1990s and is status post parathyroid surgery in 2007. She developed recurrent primary hyperparathyroidism around 2017. She has hypercalcemia with inappropriately normal PTH concentrations consistent with primary hyperparathyroidism. Evaluation for other causes of hypercalcemia unremarkable. We have discussed the complications of primary hyperparathyroidism, including but not limited to hypercalcemia, nephrolithiasis, and osteoporosis. Despite densitometric osteopenia, her diagnosis is osteoporosis in the setting of a low trauma wrist fracture. We discussed the potential benefits and risks of antiresorptive osteoporosis therapy, including but not limited to osteonecrosis of the jaw and atypical femoral fracture. She is amenable to therapy with alendronate. We reviewed proper use and compliance with alendronate. We discussed the recommendations for calcium and vitamin D intake; there is no indication to restrict calcium intake in patients with primary hyperparathyroidism. She is interested in genetic testing due to her family history of primary hyperparathyroidism in her father and personal history of recurrent disease. She is reconsidering whether she wants to proceed with parathyroid surgery at this time. Monitor calciotropic panel Genetic testing for the hyperparathyroid panel, including: AP2S1, CASR, CDC73, CDKN1B, GNA11, MEN1, RET Start alendronate 70 mg weekly Calcium 3408-9190 mg daily from diet and supplements (to be taken in divided doses as no more than 500-600 mg can be absorbed at one time) Continue current vitamin D regimen pending level  Type 2 diabetes mellitus. HbA1c 6.2% in May 2023 and glucose 89 mg/dL today. No known history of micro- or macrovascular complications. I congratulated her on her excellent glycemic control. We have discussed the importance of diet and exercise and lifestyle modification for glycemic control. She is tolerating her current regimen and we will continue. Continue metformin ER 1000 mg daily She is on a blood pressure regimen; blood pressure around goal She is not on a statin for cholesterol; defer to cardiology Nephropathy screening: Treated with an angiotenin receptor blocker Last ophthalmology appointment: January 2024; annual Last podiatry appointment: December 2023; every nine weeks Last dental appointment: Ongoing dental work  Return to see me in 6 months or earlier as needed. Patient advised to call earlier with significant hypo- or hyperglycemia.  CC: Dr. Stephanie King, Fax [phone] Dr. Alfredo Bowen, Fax [phone] Dr. Em Buenrostro, Fax [phone]

## 2024-01-17 NOTE — ADDENDUM
[FreeTextEntry1] : Recent laboratory results as below. Serum calcium within 1 mg/dL above the upper normal limit. HbA1c 6.1%. LDL elevated; defer to cardiology Urine microalbumin and other test results within range. I left a telephone message for Ms. Stern to discuss. 1/17/24

## 2024-07-19 ENCOUNTER — APPOINTMENT (OUTPATIENT)
Dept: ENDOCRINOLOGY | Facility: CLINIC | Age: 79
End: 2024-07-19
Payer: MEDICARE

## 2024-07-19 VITALS
BODY MASS INDEX: 28.79 KG/M2 | WEIGHT: 173 LBS | DIASTOLIC BLOOD PRESSURE: 66 MMHG | HEART RATE: 73 BPM | SYSTOLIC BLOOD PRESSURE: 147 MMHG

## 2024-07-19 DIAGNOSIS — E55.9 VITAMIN D DEFICIENCY, UNSPECIFIED: ICD-10-CM

## 2024-07-19 DIAGNOSIS — M81.0 AGE-RELATED OSTEOPOROSIS W/OUT CURRENT PATHOLOGICAL FRACTURE: ICD-10-CM

## 2024-07-19 DIAGNOSIS — E21.0 PRIMARY HYPERPARATHYROIDISM: ICD-10-CM

## 2024-07-19 DIAGNOSIS — E11.9 TYPE 2 DIABETES MELLITUS W/OUT COMPLICATIONS: ICD-10-CM

## 2024-07-19 LAB
GLUCOSE BLDC GLUCOMTR-MCNC: 82
HBA1C MFR BLD HPLC: 6

## 2024-07-19 PROCEDURE — G2211 COMPLEX E/M VISIT ADD ON: CPT

## 2024-07-19 PROCEDURE — 83036 HEMOGLOBIN GLYCOSYLATED A1C: CPT | Mod: QW

## 2024-07-19 PROCEDURE — 82962 GLUCOSE BLOOD TEST: CPT

## 2024-07-19 PROCEDURE — 99214 OFFICE O/P EST MOD 30 MIN: CPT

## 2024-10-07 ENCOUNTER — TRANSCRIPTION ENCOUNTER (OUTPATIENT)
Age: 79
End: 2024-10-07

## 2025-01-30 ENCOUNTER — APPOINTMENT (OUTPATIENT)
Dept: ENDOCRINOLOGY | Facility: CLINIC | Age: 80
End: 2025-01-30
Payer: MEDICARE

## 2025-01-30 VITALS
SYSTOLIC BLOOD PRESSURE: 123 MMHG | HEIGHT: 65 IN | BODY MASS INDEX: 29.82 KG/M2 | WEIGHT: 179 LBS | DIASTOLIC BLOOD PRESSURE: 63 MMHG | HEART RATE: 77 BPM

## 2025-01-30 DIAGNOSIS — Z98.890 OTHER SPECIFIED POSTPROCEDURAL STATES: ICD-10-CM

## 2025-01-30 DIAGNOSIS — E11.9 TYPE 2 DIABETES MELLITUS W/OUT COMPLICATIONS: ICD-10-CM

## 2025-01-30 DIAGNOSIS — Z90.89 OTHER SPECIFIED POSTPROCEDURAL STATES: ICD-10-CM

## 2025-01-30 DIAGNOSIS — M81.0 AGE-RELATED OSTEOPOROSIS W/OUT CURRENT PATHOLOGICAL FRACTURE: ICD-10-CM

## 2025-01-30 DIAGNOSIS — E21.0 PRIMARY HYPERPARATHYROIDISM: ICD-10-CM

## 2025-01-30 LAB
GLUCOSE BLDC GLUCOMTR-MCNC: 93
HBA1C MFR BLD HPLC: 5.8

## 2025-01-30 PROCEDURE — G2211 COMPLEX E/M VISIT ADD ON: CPT

## 2025-01-30 PROCEDURE — 82962 GLUCOSE BLOOD TEST: CPT

## 2025-01-30 PROCEDURE — 83036 HEMOGLOBIN GLYCOSYLATED A1C: CPT | Mod: QW

## 2025-01-30 PROCEDURE — 99214 OFFICE O/P EST MOD 30 MIN: CPT

## 2025-02-06 ENCOUNTER — TRANSCRIPTION ENCOUNTER (OUTPATIENT)
Age: 80
End: 2025-02-06

## 2025-02-06 ENCOUNTER — APPOINTMENT (OUTPATIENT)
Dept: OTOLARYNGOLOGY | Facility: CLINIC | Age: 80
End: 2025-02-06

## 2025-07-30 ENCOUNTER — NON-APPOINTMENT (OUTPATIENT)
Age: 80
End: 2025-07-30

## 2025-07-30 ENCOUNTER — APPOINTMENT (OUTPATIENT)
Dept: ENDOCRINOLOGY | Facility: CLINIC | Age: 80
End: 2025-07-30
Payer: MEDICARE

## 2025-07-30 VITALS
SYSTOLIC BLOOD PRESSURE: 146 MMHG | WEIGHT: 175 LBS | HEART RATE: 79 BPM | DIASTOLIC BLOOD PRESSURE: 54 MMHG | BODY MASS INDEX: 29.12 KG/M2

## 2025-07-30 DIAGNOSIS — M81.0 AGE-RELATED OSTEOPOROSIS W/OUT CURRENT PATHOLOGICAL FRACTURE: ICD-10-CM

## 2025-07-30 DIAGNOSIS — E55.9 VITAMIN D DEFICIENCY, UNSPECIFIED: ICD-10-CM

## 2025-07-30 DIAGNOSIS — E11.9 TYPE 2 DIABETES MELLITUS W/OUT COMPLICATIONS: ICD-10-CM

## 2025-07-30 DIAGNOSIS — Z98.890 OTHER SPECIFIED POSTPROCEDURAL STATES: ICD-10-CM

## 2025-07-30 DIAGNOSIS — E21.0 PRIMARY HYPERPARATHYROIDISM: ICD-10-CM

## 2025-07-30 DIAGNOSIS — Z90.89 OTHER SPECIFIED POSTPROCEDURAL STATES: ICD-10-CM

## 2025-07-30 LAB
GLUCOSE BLDC GLUCOMTR-MCNC: 94
HBA1C MFR BLD HPLC: 5.8

## 2025-07-30 PROCEDURE — G2211 COMPLEX E/M VISIT ADD ON: CPT

## 2025-07-30 PROCEDURE — 83036 HEMOGLOBIN GLYCOSYLATED A1C: CPT | Mod: QW

## 2025-07-30 PROCEDURE — 82962 GLUCOSE BLOOD TEST: CPT

## 2025-07-30 PROCEDURE — 99214 OFFICE O/P EST MOD 30 MIN: CPT

## 2025-07-30 PROCEDURE — 36415 COLL VENOUS BLD VENIPUNCTURE: CPT

## 2025-07-30 RX ORDER — L-METHYLFOLATE-ALGAE-VIT B12-B6 CAP 3-90.314-2-35 MG 3-90.314-2-35 MG
CAP ORAL
Refills: 0 | Status: ACTIVE | COMMUNITY

## 2025-07-31 LAB
25(OH)D3 SERPL-MCNC: 50.6 NG/ML
ALBUMIN SERPL ELPH-MCNC: 5 G/DL
ALBUMIN, RANDOM URINE: <1.2 MG/DL
ALP BLD-CCNC: 129 U/L
ALT SERPL-CCNC: 22 U/L
ANION GAP SERPL CALC-SCNC: 13 MMOL/L
AST SERPL-CCNC: 16 U/L
BASOPHILS # BLD AUTO: 0.05 K/UL
BASOPHILS NFR BLD AUTO: 0.5 %
BILIRUB SERPL-MCNC: 0.2 MG/DL
BUN SERPL-MCNC: 18 MG/DL
CALCIUM SERPL-MCNC: 11.5 MG/DL
CALCIUM SERPL-MCNC: 11.5 MG/DL
CHLORIDE SERPL-SCNC: 103 MMOL/L
CHOLEST SERPL-MCNC: 189 MG/DL
CO2 SERPL-SCNC: 24 MMOL/L
CREAT SERPL-MCNC: 0.75 MG/DL
CREAT SPEC-SCNC: 65 MG/DL
EGFRCR SERPLBLD CKD-EPI 2021: 81 ML/MIN/1.73M2
EOSINOPHIL # BLD AUTO: 0.2 K/UL
EOSINOPHIL NFR BLD AUTO: 1.9 %
GLUCOSE SERPL-MCNC: 89 MG/DL
HCT VFR BLD CALC: 42.9 %
HDLC SERPL-MCNC: 60 MG/DL
HGB BLD-MCNC: 13.7 G/DL
IMM GRANULOCYTES NFR BLD AUTO: 0.2 %
LDLC SERPL-MCNC: 104 MG/DL
LYMPHOCYTES # BLD AUTO: 2.39 K/UL
LYMPHOCYTES NFR BLD AUTO: 22.8 %
MAGNESIUM SERPL-MCNC: 2.5 MG/DL
MAN DIFF?: NORMAL
MCHC RBC-ENTMCNC: 31.1 PG
MCHC RBC-ENTMCNC: 31.9 G/DL
MCV RBC AUTO: 97.3 FL
MICROALBUMIN/CREAT 24H UR-RTO: NORMAL MG/G
MONOCYTES # BLD AUTO: 0.79 K/UL
MONOCYTES NFR BLD AUTO: 7.5 %
NEUTROPHILS # BLD AUTO: 7.03 K/UL
NEUTROPHILS NFR BLD AUTO: 67.1 %
NONHDLC SERPL-MCNC: 128 MG/DL
PARATHYROID HORMONE INTACT: 49 PG/ML
PHOSPHATE SERPL-MCNC: 3 MG/DL
PLATELET # BLD AUTO: 340 K/UL
POTASSIUM SERPL-SCNC: 4.3 MMOL/L
PROT SERPL-MCNC: 7.6 G/DL
RBC # BLD: 4.41 M/UL
RBC # FLD: 14 %
SODIUM SERPL-SCNC: 139 MMOL/L
TRIGL SERPL-MCNC: 139 MG/DL
TSH SERPL-ACNC: 0.76 UIU/ML
VIT B12 SERPL-MCNC: >2000 PG/ML
WBC # FLD AUTO: 10.48 K/UL

## 2025-08-07 ENCOUNTER — TRANSCRIPTION ENCOUNTER (OUTPATIENT)
Age: 80
End: 2025-08-07